# Patient Record
Sex: MALE | Race: WHITE | Employment: FULL TIME | URBAN - METROPOLITAN AREA
[De-identification: names, ages, dates, MRNs, and addresses within clinical notes are randomized per-mention and may not be internally consistent; named-entity substitution may affect disease eponyms.]

---

## 2023-03-20 PROBLEM — F11.11 HX OF OPIOID ABUSE (HCC): Status: ACTIVE | Noted: 2023-03-20

## 2023-03-20 PROBLEM — Z51.81 THERAPEUTIC DRUG MONITORING: Status: ACTIVE | Noted: 2023-03-20

## 2023-03-20 PROBLEM — F19.91 HISTORY OF INTRAVENOUS DRUG USE IN REMISSION: Status: ACTIVE | Noted: 2023-03-20

## 2023-03-20 PROBLEM — Z87.898 HISTORY OF INTRAVENOUS DRUG USE IN REMISSION: Status: ACTIVE | Noted: 2023-03-20

## 2023-05-19 RX ORDER — CLONAZEPAM 2 MG/1
3 TABLET ORAL DAILY PRN
COMMUNITY
Start: 2023-04-21 | End: 2023-05-23 | Stop reason: SDUPTHER

## 2023-05-19 RX ORDER — ZIPRASIDONE HYDROCHLORIDE 20 MG/1
20 CAPSULE ORAL
COMMUNITY
Start: 2023-04-21 | End: 2023-05-23 | Stop reason: SDUPTHER

## 2023-06-06 ENCOUNTER — NURSE ONLY (OUTPATIENT)
Age: 33
End: 2023-06-06

## 2023-06-06 DIAGNOSIS — F11.11 HX OF OPIOID ABUSE (HCC): ICD-10-CM

## 2023-06-06 DIAGNOSIS — F41.9 ANXIETY: ICD-10-CM

## 2023-06-06 DIAGNOSIS — Z51.81 THERAPEUTIC DRUG MONITORING: ICD-10-CM

## 2023-07-17 ENCOUNTER — TELEMEDICINE (OUTPATIENT)
Age: 33
End: 2023-07-17
Payer: MEDICARE

## 2023-07-17 ENCOUNTER — TELEPHONE (OUTPATIENT)
Age: 33
End: 2023-07-17

## 2023-07-17 DIAGNOSIS — K52.9 ACUTE GASTROENTERITIS: ICD-10-CM
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
## 2023-07-17 DIAGNOSIS — F41.9 ANXIETY: ICD-10-CM

## 2023-07-17 PROCEDURE — 99442 PR PHYS/QHP TELEPHONE EVALUATION 11-20 MIN: CPT | Performed by: FAMILY MEDICINE

## 2023-07-17 RX ORDER — CLONAZEPAM 2 MG/1
2 TABLET ORAL 2 TIMES DAILY PRN
Qty: 60 TABLET | Refills: 1 | Status: SHIPPED | OUTPATIENT
Start: 2023-07-20 | End: 2023-09-18

## 2023-07-17 RX ORDER — METRONIDAZOLE 500 MG/1
500 TABLET ORAL 2 TIMES DAILY
Qty: 14 TABLET | Refills: 0 | Status: SHIPPED | OUTPATIENT
Start: 2023-07-17 | End: 2023-07-24

## 2023-07-17 ASSESSMENT — PATIENT HEALTH QUESTIONNAIRE - PHQ9
2. FEELING DOWN, DEPRESSED OR HOPELESS: 0
SUM OF ALL RESPONSES TO PHQ QUESTIONS 1-9: 0
1. LITTLE INTEREST OR PLEASURE IN DOING THINGS: 0
SUM OF ALL RESPONSES TO PHQ QUESTIONS 1-9: 0
SUM OF ALL RESPONSES TO PHQ9 QUESTIONS 1 & 2: 0

## 2023-07-17 NOTE — PROGRESS NOTES
Sabrina Pierce is a 34 y.o. male presenting for/with:    Chief Complaint   Patient presents with    Other     Pt reports flee bites and that his Cat had flees really bad a 1 week ago and he has experienced nausea,stomach pain, vomiting fatigue and a headache. Pt reports taking Pepto bismol last night which hasn't helped his stomach pain. There were no vitals filed for this visit. Pain Scale: /10  Pain Location:     1. \"Have you been to the ER, urgent care clinic since your last visit? Hospitalized since your last visit? \" No    2. \"Have you seen or consulted any other health care providers outside of the [address] [place] [address] since your last visit? \" No     3. For patients aged 43-73: Has the patient had a colonoscopy / FIT/ Cologuard? NA - based on age     If the patient is female:    4. For patients aged 43-66: Has the patient had a mammogram within the past 2 years? NA - based on age    11. For patients aged 21-65: Has the patient had a pap smear? NA - based on age      PHQ-9  7/17/2023   Little interest or pleasure in doing things 0   Little interest or pleasure in doing things -   Feeling down, depressed, or hopeless 0   Trouble falling or staying asleep, or sleeping too much -   Feeling tired or having little energy -   Poor appetite or overeating -   Feeling bad about yourself - or that you are a failure or have let yourself or your family down -   Trouble concentrating on things, such as reading the newspaper or watching television -   Moving or speaking so slowly that other people could have noticed.  Or the opposite - being so fidgety or restless that you have been moving around a lot more than usual -   PHQ-2 Score 0   Total Score PHQ 2 -   PHQ-9 Total Score 0       [facility] AMB LEARNING ASSESSMENT 5/23/2023 3/20/2023   PRIMARY LEARNER Patient Patient   PRIMARY LANGUAGE ENGLISH ENGLISH   LEARNER PREFERENCE PRIMARY DEMONSTRATION DEMONSTRATION   ANSWERED BY patient patient   RELATIONSHIP

## 2023-07-17 NOTE — TELEPHONE ENCOUNTER
Call back # [phone], pt called wanting to be seen today. He states that his cat has flees and he's been bitten so much and he's starting to feel sick to his stomach and pt also has a headache.

## 2023-07-17 NOTE — PROGRESS NOTES
Kaitlynn Vargas is a 34 y.o. male who was seen by synchronous (real-time) audio technology on 7/17/2023. Pt was seen at home in [place]. Provider was at the office. No other participants in this encounter. Subjective: Other (Pt reports flee bites and that his Cat had flees really bad a 1 week ago and he has experienced nausea,stomach pain, vomiting fatigue and a headache.//Pt reports taking Pepto bismol last night which hasn't helped his stomach pain. /)    HPI:  Symptoms include mult flea bites on legs about 2 wk ago, were on his cat. Got cat treated, cleaned up the area, no bities in about 2 weeks. Bites gradually resolved, but about a week ago started having fatigue, HA, loose non-bloody stool about qod. No f/c. No rash or swollen LN's. Peggy po fluids ok. Some rhinorrhea lately, without sinus pain. No epistaxis. Evaluation to date: none. Treatment to date: fluids, pepto bismol, helps some. Anxiety  Currently symptoms are ok on current treatment of clonazepam, 2mg BID and boosted dose geodon to 40mg/d. No sedation about once/week nowadays. Doing well on sertraline 50mg/d + geodon 20mg/d feels more in control with that. Prev on vraylar, remeron, peggy well, but didn't work that well. PHQ-9 Total Score: 0 (7/17/2023 10:22 AM)    PMH, SH, Medications/Allergies: reviewed, on chart. Hx IVDU a couple times in 2020 timeframe. Has a new roommate and is off work lately. Having to get dentures in [employer], has edentulation planned late [address]. Reviewed PMH, PSH, SH, Medications, allergies (see chart). Current Outpatient Medications   Medication Sig    ziprasidone (GEODON) 40 MG capsule Take 1 capsule by mouth daily Indications: nerves    clonazePAM (KLONOPIN) 2 MG tablet Take 1 tablet by mouth 2 times daily as needed for Anxiety for up to 60 days. Max Daily Amount: 4 mg    sertraline (ZOLOFT) 50 MG tablet Take 1 tablet by mouth daily     No current facility-administered medications for this visit.

## 2023-07-24 ENCOUNTER — TELEMEDICINE (OUTPATIENT)
Age: 33
End: 2023-07-24
Payer: MEDICARE

## 2023-07-24 DIAGNOSIS — F41.9 ANXIETY: Primary | ICD-10-CM

## 2023-07-24 DIAGNOSIS — E83.51 HYPOCALCEMIA: ICD-10-CM

## 2023-07-24 PROCEDURE — 99442 PR PHYS/QHP TELEPHONE EVALUATION 11-20 MIN: CPT | Performed by: FAMILY MEDICINE

## 2023-07-24 RX ORDER — CHOLECALCIFEROL (VITAMIN D3) 1250 MCG
50000 CAPSULE ORAL WEEKLY
Qty: 12 CAPSULE | Refills: 3 | Status: SHIPPED | OUTPATIENT
Start: 2023-07-24

## 2023-07-24 ASSESSMENT — PATIENT HEALTH QUESTIONNAIRE - PHQ9
SUM OF ALL RESPONSES TO PHQ QUESTIONS 1-9: 0
1. LITTLE INTEREST OR PLEASURE IN DOING THINGS: 0
SUM OF ALL RESPONSES TO PHQ9 QUESTIONS 1 & 2: 0
2. FEELING DOWN, DEPRESSED OR HOPELESS: 0

## 2023-07-24 NOTE — PROGRESS NOTES
Arnold Saab is a 34 y.o. male presenting for/with:    Chief Complaint   Patient presents with    Anxiety    Medication Check       There were no vitals filed for this visit. Pain Scale: /10  Pain Location:     1. \"Have you been to the ER, urgent care clinic since your last visit? Hospitalized since your last visit? \" No    2. \"Have you seen or consulted any other health care providers outside of the [address] [place] [address] since your last visit? \" No     3. For patients aged 43-73: Has the patient had a colonoscopy / FIT/ Cologuard? NA - based on age     If the patient is female:    4. For patients aged 43-66: Has the patient had a mammogram within the past 2 years? NA - based on age    11. For patients aged 21-65: Has the patient had a pap smear? NA - based on age      PHQ-9  7/24/2023   Little interest or pleasure in doing things 0   Little interest or pleasure in doing things -   Feeling down, depressed, or hopeless 0   Trouble falling or staying asleep, or sleeping too much -   Feeling tired or having little energy -   Poor appetite or overeating -   Feeling bad about yourself - or that you are a failure or have let yourself or your family down -   Trouble concentrating on things, such as reading the newspaper or watching television -   Moving or speaking so slowly that other people could have noticed.  Or the opposite - being so fidgety or restless that you have been moving around a lot more than usual -   PHQ-2 Score 0   Total Score PHQ 2 -   PHQ-9 Total Score 0       [facility] AMB LEARNING ASSESSMENT 7/24/2023 5/23/2023 3/20/2023   PRIMARY LEARNER Patient Patient Patient   PRIMARY LANGUAGE ENGLISH ENGLISH ENGLISH   LEARNER PREFERENCE PRIMARY DEMONSTRATION DEMONSTRATION DEMONSTRATION   ANSWERED BY patient patient patient   RELATIONSHIP SELF SELF SELF        Amb Fall Risk Assessment and TUG Test 5/23/2023   Do you feel unsteady or are you worried about falling?  no   2 or more falls in past year? no   Fall

## 2023-07-24 NOTE — PROGRESS NOTES
Clarice Torres is a 34 y.o. male who was seen by synchronous (real-time) audio technology on 7/24/2023. Pt was seen at home in [facility]. Provider was at the office. No other participants in this encounter. Subjective: Anxiety and Medication Check    HPI:  Anxiety  Currently symptoms are ok on current treatment of clonazepam, 2mg BID, geodon 40mg/d, sertraline 50mg/d . Peggy well, no sedation. Feels more in control with that. Prev on vraylar, remeron, peggy well, but didn't work that well. PHQ-9 Total Score: 0 (7/24/2023 10:20 AM)    PMH, SH, Medications/Allergies: reviewed, on chart. Hx IVDU a couple times in 2020 timeframe. Has a new roommate and is off work lately. Having to get dentures in [place], has edentulation planned late [address]. Reviewed PMH, PSH, SH, Medications, allergies (see chart). Current Outpatient Medications   Medication Sig    clonazePAM (KLONOPIN) 2 MG tablet Take 1 tablet by mouth 2 times daily as needed for Anxiety for up to 60 days. Max Daily Amount: 4 mg    metroNIDAZOLE (FLAGYL) 500 MG tablet Take 1 tablet by mouth 2 times daily for 7 days Indications: stomach infection    ziprasidone (GEODON) 40 MG capsule Take 1 capsule by mouth daily Indications: nerves    sertraline (ZOLOFT) 50 MG tablet Take 1 tablet by mouth daily     No current facility-administered medications for this visit. ROS:   General: No fever, chills, or abnormal weight loss  Respiratory: No cough, dyspnea  CV: No chest pain, palpitations    Objective:   Wt Readings from Last 3 Encounters:   04/21/23 195 lb 12.8 oz (88.8 kg)   03/20/23 198 lb 6.4 oz (90 kg)   02/24/22 200 lb (90.7 kg)     BP Readings from Last 3 Encounters:   04/21/23 138/78   03/20/23 112/72   02/24/22 112/74      Physical Examination: General appearance - alert, well appearing, and in no distress   Mental status - alert, oriented to person, place, and time   Eyes - pupils equal and reactive, extraocular eye movements intact   ENT -

## 2023-09-11 DIAGNOSIS — F41.9 ANXIETY: ICD-10-CM

## 2023-09-11 RX ORDER — CLONAZEPAM 2 MG/1
2 TABLET ORAL 2 TIMES DAILY PRN
Qty: 14 TABLET | Refills: 0 | Status: SHIPPED | OUTPATIENT
Start: 2023-09-11 | End: 2023-09-18

## 2023-09-11 NOTE — TELEPHONE ENCOUNTER
----- Message from Mook Santana sent at 9/11/2023  1:03 PM EDT -----  Subject: Refill Request    QUESTIONS  Name of Medication? clonazePAM (KLONOPIN) 2 MG tablet  Patient-reported dosage and instructions? Take 1 tablet 2 times daily  How many days do you have left? 2  [id number]  Pharmacy phone number (if available)? [phone]  ---------------------------------------------------------------------------  --------------  CALL BACK INFO  What is the best way for the office to contact you? Do not leave any   message, patient will call back for answer  Preferred Call Back Phone Number? [phone]  ---------------------------------------------------------------------------  --------------  SCRIPT ANSWERS  Relationship to Patient?  Self

## 2023-09-11 NOTE — TELEPHONE ENCOUNTER
2 mo fill was given in JUL 2023, and both have been filled, on on 7/17 and one on 8/14, 3 days early. He is not due for a renewal until 9/15 by dates. [place]  reviewed. PT got a fill of #20 norco 5mg from Dr Harsh Yu DDS on 8/4, which is a high risk med to combine with clonazepam 2mg BID. Chart reviewed, and no notes informing us of opioid Rx's are on chart. 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 Contacted pt. Reviewed med use agreement. Noted that pt needs to be better about adhering to his regular regimen and better about informing me of emergency pain relief meds. Will give single short fill to last until visit. Made clear to pt that any further irregularities would result in my being unable to continue to manage his anxiety with this kind of med, and would require a wean. Pt gave understanding.

## 2023-09-20 ENCOUNTER — OFFICE VISIT (OUTPATIENT)
Age: 33
End: 2023-09-20
Payer: MEDICARE

## 2023-09-20 VITALS
HEART RATE: 62 BPM | HEIGHT: 69 IN | SYSTOLIC BLOOD PRESSURE: 150 MMHG | RESPIRATION RATE: 20 BRPM | TEMPERATURE: 98.2 F | WEIGHT: 187 LBS | DIASTOLIC BLOOD PRESSURE: 101 MMHG | BODY MASS INDEX: 27.7 KG/M2 | OXYGEN SATURATION: 99 %

## 2023-09-20 DIAGNOSIS — Z51.81 THERAPEUTIC DRUG MONITORING: ICD-10-CM

## 2023-09-20 DIAGNOSIS — E83.51 HYPOCALCEMIA: ICD-10-CM

## 2023-09-20 DIAGNOSIS — F41.9 ANXIETY: Primary | ICD-10-CM

## 2023-09-20 PROCEDURE — 99213 OFFICE O/P EST LOW 20 MIN: CPT | Performed by: FAMILY MEDICINE

## 2023-09-20 RX ORDER — MIRTAZAPINE 7.5 MG/1
7.5 TABLET, FILM COATED ORAL NIGHTLY
Qty: 30 TABLET | Refills: 5 | Status: SHIPPED | OUTPATIENT
Start: 2023-09-20

## 2023-09-20 RX ORDER — CLONAZEPAM 2 MG/1
2 TABLET ORAL 2 TIMES DAILY PRN
Qty: 60 TABLET | Refills: 2 | Status: SHIPPED | OUTPATIENT
Start: 2023-09-20 | End: 2023-12-19

## 2023-09-20 ASSESSMENT — PATIENT HEALTH QUESTIONNAIRE - PHQ9
2. FEELING DOWN, DEPRESSED OR HOPELESS: 0
SUM OF ALL RESPONSES TO PHQ9 QUESTIONS 1 & 2: 0
SUM OF ALL RESPONSES TO PHQ QUESTIONS 1-9: 0
1. LITTLE INTEREST OR PLEASURE IN DOING THINGS: 0
SUM OF ALL RESPONSES TO PHQ QUESTIONS 1-9: 0

## 2023-09-20 NOTE — PROGRESS NOTES
(includes cane/walker) No Help Needed No Help Needed No Help Needed No Help Needed   Using the telphone No Help Needed No Help Needed No Help Needed No Help Needed   Taking your medications No Help Needed No Help Needed No Help Needed No Help Needed   Preparing meals No Help Needed No Help Needed No Help Needed No Help Needed   Managing money (expenses/bills) No Help Needed No Help Needed No Help Needed No Help Needed   Moderately strenuous housework (laundry) No Help Needed No Help Needed No Help Needed No Help Needed   Shopping for personal items (toiletries/medicines) No Help Needed No Help Needed No Help Needed No Help Needed   Shopping for groceries No Help Needed No Help Needed No Help Needed No Help Needed   Driving No Help Needed No Help Needed No Help Needed No Help Needed   Climbing a flight of stairs No Help Needed No Help Needed No Help Needed No Help Needed   Getting to places beyond walking distances No Help Needed No Help Needed No Help Needed No Help Needed           9/20/2023    11:00 AM   AMB Abuse Screening   Do you ever feel afraid of your partner? N   Are you in a relationship with someone who physically or mentally threatens you? N   Is it safe for you to go home?  [facility]     The patient has appointed the following active healthcare agents:    Primary Decision Maker: Denise Escobar - [facility] - [phone]

## 2023-09-21 LAB
BUN SERPL-MCNC: 10 MG/DL (ref 6–20)
BUN/CREAT SERPL: 15 (ref 9–20)
CALCIUM SERPL-MCNC: 9.5 MG/DL (ref 8.7–10.2)
CHLORIDE SERPL-SCNC: 103 MMOL/L (ref 96–106)
CO2 SERPL-SCNC: 25 MMOL/L (ref 20–29)
CREAT SERPL-MCNC: 0.66 MG/DL (ref 0.76–1.27)
EGFRCR SERPLBLD CKD-EPI 2021: 126 ML/MIN/1.73
GLUCOSE SERPL-MCNC: 91 MG/DL (ref 70–99)
POTASSIUM SERPL-SCNC: 4.8 MMOL/L (ref 3.5–5.2)
SODIUM SERPL-SCNC: 142 MMOL/L (ref 134–144)

## 2023-09-25 ENCOUNTER — PATIENT MESSAGE (OUTPATIENT)
Age: 33
End: 2023-09-25

## 2023-09-25 DIAGNOSIS — F41.9 ANXIETY: Primary | ICD-10-CM

## 2023-09-25 RX ORDER — DOXEPIN HYDROCHLORIDE 25 MG/1
25 CAPSULE ORAL NIGHTLY
Qty: 30 CAPSULE | Refills: 11 | Status: SHIPPED | OUTPATIENT
Start: 2023-09-25 | End: 2023-09-29 | Stop reason: ALTCHOICE

## 2023-09-25 NOTE — TELEPHONE ENCOUNTER
[facility] 9/25/2023 1:03 PM EDT      ----- Message -----  From: Esther Menjivar  Sent: 9/25/2023 12:42 PM EDT  To: *  Subject: Remron/sleep     Dr. Yusuf Michaels,     The doxepin sounds like a good idea. The family its in of antidepressants cyclotic or something, I don't think I've tried those before. But yes lower brain activity while I'm sleeping sounds similar to a anti anxiety to me. Sounds like 2 for one to me. Sleep better and depression. 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      Great suggestion and thanks for your time,  Irena Lane

## 2023-09-27 ENCOUNTER — HOSPITAL ENCOUNTER (EMERGENCY)
Facility: HOSPITAL | Age: 33
Discharge: HOME | End: 2023-09-27
Attending: EMERGENCY MEDICINE
Payer: MEDICARE

## 2023-09-27 VITALS
RESPIRATION RATE: 16 BRPM | HEART RATE: 104 BPM | SYSTOLIC BLOOD PRESSURE: 135 MMHG | TEMPERATURE: 98.8 F | DIASTOLIC BLOOD PRESSURE: 86 MMHG | OXYGEN SATURATION: 100 %

## 2023-09-27 DIAGNOSIS — G47.00 INSOMNIA, UNSPECIFIED TYPE: Primary | ICD-10-CM

## 2023-09-27 DIAGNOSIS — G47.00 DISORDER OF INITIATING AND MAINTAINING SLEEP: ICD-10-CM

## 2023-09-27 PROCEDURE — 99284 EMERGENCY DEPT VISIT MOD MDM: CPT

## 2023-09-27 PROCEDURE — 6360000002 HC RX W HCPCS: Performed by: EMERGENCY MEDICINE

## 2023-09-27 PROCEDURE — 96372 THER/PROPH/DIAG INJ SC/IM: CPT

## 2023-09-27 RX ORDER — ZOLPIDEM TARTRATE 5 MG/1
5 TABLET ORAL NIGHTLY PRN
Qty: 6 TABLET | Refills: 0 | Status: SHIPPED | OUTPATIENT
Start: 2023-09-27 | End: 2023-09-29 | Stop reason: ALTCHOICE

## 2023-09-27 RX ORDER — HYDROXYZINE HYDROCHLORIDE 25 MG/ML
50 INJECTION, SOLUTION INTRAMUSCULAR
Status: COMPLETED | OUTPATIENT
Start: 2023-09-27 | End: 2023-09-27

## 2023-09-27 RX ADMIN — HYDROXYZINE HYDROCHLORIDE 50 MG: 25 INJECTION, SOLUTION INTRAMUSCULAR at 04:43

## 2023-09-27 ASSESSMENT — ENCOUNTER SYMPTOMS
ABDOMINAL PAIN: 0
SHORTNESS OF BREATH: 0

## 2023-09-27 ASSESSMENT — LIFESTYLE VARIABLES: HOW MANY STANDARD DRINKS CONTAINING ALCOHOL DO YOU HAVE ON A TYPICAL DAY: PATIENT DOES NOT DRINK

## 2023-09-27 ASSESSMENT — PAIN - FUNCTIONAL ASSESSMENT: PAIN_FUNCTIONAL_ASSESSMENT: NONE - DENIES PAIN

## 2023-09-27 NOTE — ED TRIAGE NOTES
Patient states that he has been having trouble staying asleep.  According to the patient he has only had 10 hours of sleep in the last 5 days

## 2023-09-27 NOTE — ED PROVIDER NOTES
[facility] [address] [facility]       Pt Name: Kristin Ventura  MRN: [id number]  [facility] 1990  Date of evaluation: 9/27/2023  Provider: Diane Reid MD   PCP: Rashida Patten MD  Note Started: 4:38 AM 9/27/23      FINAL IMPRESSION     1. Insomnia, unspecified type    2. Disorder of initiating and maintaining sleep          DISPOSITION/PLAN     Disposition:  DISPOSITION Discharge - Pending Orders Complete 09/27/2023 04:29:45 AM      Discharge Note: The patient is stable for discharge home. The signs, symptoms, diagnosis, and discharge instructions have been discussed, understanding conveyed, and agreed upon. The patient is to follow up as recommended or return to ER should their symptoms worsen. PATIENT REFERRED TO:  Autumn Ortega MD  [address] [place] [address]  [phone]    Schedule an appointment as soon as possible for a visit in 1 day  For Follow Up          DISCHARGE MEDICATIONS:     Medication List        START taking these medications      zolpidem 5 MG tablet  Commonly known as: AMBIEN  Take 1 tablet by mouth nightly as needed for Sleep (sleep maintenance) for up to 6 days. Max Daily Amount: 5 mg            ASK your doctor about these medications      clonazePAM 2 MG tablet  Commonly known as: KLONOPIN  Take 1 tablet by mouth 2 times daily as needed for Anxiety for up to 90 days.  Max Daily Amount: 4 mg     doxepin 25 MG capsule  Commonly known as: SINEQUAN  Take 1 capsule by mouth nightly Indications: nerves and sleep     Vitamin D3 1.25 MG (20607 UT) Caps  Take 1 capsule by mouth once a week Indications: LOW CALCIUM     ziprasidone 40 MG capsule  Commonly known as: GEODON  Take 1 capsule by mouth daily Indications: nerves               Where to Get Your Medications        [facility], [address] [place] [address], [address] [place] [address]

## 2023-09-29 ENCOUNTER — TELEMEDICINE (OUTPATIENT)
Age: 33
End: 2023-09-29
Payer: MEDICARE

## 2023-09-29 DIAGNOSIS — F11.11 HX OF OPIOID ABUSE (HCC): ICD-10-CM

## 2023-09-29 DIAGNOSIS — F19.91 OTHER PSYCHOACTIVE SUBSTANCE USE, UNSPECIFIED, IN REMISSION: ICD-10-CM

## 2023-09-29 DIAGNOSIS — F30.8 HYPOMANIA (HCC): ICD-10-CM

## 2023-09-29 DIAGNOSIS — F41.9 ANXIETY: Primary | ICD-10-CM

## 2023-09-29 PROCEDURE — 99442 PR PHYS/QHP TELEPHONE EVALUATION 11-20 MIN: CPT | Performed by: FAMILY MEDICINE

## 2023-09-29 RX ORDER — QUETIAPINE FUMARATE 300 MG/1
300 TABLET, FILM COATED ORAL NIGHTLY
Qty: 5 TABLET | Refills: 1 | Status: SHIPPED | OUTPATIENT
Start: 2023-09-29

## 2023-09-29 ASSESSMENT — PATIENT HEALTH QUESTIONNAIRE - PHQ9
SUM OF ALL RESPONSES TO PHQ QUESTIONS 1-9: 0
SUM OF ALL RESPONSES TO PHQ QUESTIONS 1-9: 0
1. LITTLE INTEREST OR PLEASURE IN DOING THINGS: 0
SUM OF ALL RESPONSES TO PHQ QUESTIONS 1-9: 0
SUM OF ALL RESPONSES TO PHQ9 QUESTIONS 1 & 2: 0
SUM OF ALL RESPONSES TO PHQ QUESTIONS 1-9: 0
2. FEELING DOWN, DEPRESSED OR HOPELESS: 0

## 2023-09-29 NOTE — PROGRESS NOTES
Bettye Singh is a 28 y.o. male who was seen by synchronous (real-time) audio technology on 9/29/2023. Pt was seen at home in [place]. Provider was at the office in [place]. No other participants in this encounter. Subjective: Insomnia  Pt reports still not sleeping well. We sent pt to ED for AMS with severe insomnia earlier this month, and pt had eval. Dx with severe insomnia and tx with adding ambien 5mg to his regimen. Pt proceeded to d/c his geodon, doxepin, and take all of the ambien in an afteroon yesterday while trying to get to sleep, thinking instructions were to \"take another ambient every time he wakes up, until sleeps good. \"    PMH, SH, Medications/Allergies: reviewed, on chart. Current Outpatient Medications   Medication Sig    QUEtiapine (SEROQUEL) 300 MG tablet Take 1 tablet by mouth at bedtime Indications: sleep and nerves    clonazePAM (KLONOPIN) 2 MG tablet Take 1 tablet by mouth 2 times daily as needed for Anxiety for up to 90 days. Max Daily Amount: 4 mg    Cholecalciferol (VITAMIN D3) 1.25 MG (18681 UT) CAPS Take 1 capsule by mouth once a week Indications: LOW CALCIUM (Patient not taking: Reported on 9/20/2023)     No current facility-administered medications for this visit. Allergies   Allergen Reactions    Sulfa Antibiotics Other (See Comments)    Sulfamethoxazole-Trimethoprim Rash       ROS:  Constitutional: No fever, chills or abnormal weight loss  Respiratory: No cough, SOB   CV: No chest pain or Palpitations    VS review:   Wt Readings from Last 3 Encounters:   09/20/23 187 lb (84.8 kg)   04/21/23 195 lb 12.8 oz (88.8 kg)   03/20/23 198 lb 6.4 oz (90 kg)     BP Readings from Last 3 Encounters:   09/27/23 135/86   09/20/23 (!) 150/101   04/21/23 138/78       Objective:     General: alert, cooperative, and no distress   Mental  status: mental status: alert, oriented to person, place, and time, normal mood, behavior, speech, dress, motor activity, and thought processes

## 2023-09-30 LAB
ALPRAZ UR QL: NEGATIVE
AMPHETAMINES UR QL SCN: NEGATIVE NG/ML
BARBITURATES UR QL SCN: NEGATIVE NG/ML
BENZODIAZ UR QL: POSITIVE NG/ML
BZE UR QL SCN: NEGATIVE NG/ML
CANNABINOIDS UR QL CFM: POSITIVE
CANNABINOIDS UR QL SCN: ABNORMAL NG/ML
CLONAZEPAM UR CFM-MCNC: 481 NG/ML
CLONAZEPAM UR QL: POSITIVE
CREAT UR-MCNC: 90.8 MG/DL (ref 20–300)
FLURAZEPAM UR QL: NEGATIVE
LABORATORY COMMENT REPORT: ABNORMAL
LORAZEPAM UR QL: NEGATIVE
METHADONE UR QL SCN: NEGATIVE NG/ML
MIDAZOLAM UR QL CFM: NEGATIVE
NORDIAZEPAM UR QL: NEGATIVE
OPIATES UR QL SCN: NEGATIVE NG/ML
OXAZEPAM UR QL: NEGATIVE
OXYCODONE+OXYMORPHONE UR QL SCN: NEGATIVE NG/ML
PCP UR QL: NEGATIVE NG/ML
PH UR: 6.5 [PH] (ref 4.5–8.9)
PROPOXYPH UR QL SCN: NEGATIVE NG/ML
SP GR UR: 1.02
TEMAZEPAM UR QL CFM: NEGATIVE
THC UR CFM-MCNC: 550 NG/ML
TRIAZOLAM UR QL: NEGATIVE

## 2024-01-19 ENCOUNTER — TELEMEDICINE (OUTPATIENT)
Age: 34
End: 2024-01-19
Payer: MEDICARE

## 2024-01-19 DIAGNOSIS — F41.9 ANXIETY: ICD-10-CM

## 2024-01-19 DIAGNOSIS — E78.2 MIXED HYPERLIPIDEMIA: ICD-10-CM

## 2024-01-19 DIAGNOSIS — Z87.81 HISTORY OF RIB FRACTURE: Primary | ICD-10-CM

## 2024-01-19 DIAGNOSIS — Z87.81 HISTORY OF WRIST FRACTURE: ICD-10-CM

## 2024-01-19 DIAGNOSIS — R73.9 HYPERGLYCEMIA: ICD-10-CM

## 2024-01-19 PROCEDURE — 99443 PR PHYS/QHP TELEPHONE EVALUATION 21-30 MIN: CPT | Performed by: FAMILY MEDICINE

## 2024-01-19 RX ORDER — METHOCARBAMOL 500 MG/1
1000 TABLET, FILM COATED ORAL 4 TIMES DAILY
COMMUNITY
Start: 2024-01-09

## 2024-01-19 RX ORDER — CLONAZEPAM 2 MG/1
2 TABLET ORAL 2 TIMES DAILY PRN
Qty: 60 TABLET | Refills: 2 | Status: SHIPPED | OUTPATIENT
Start: 2024-01-22 | End: 2024-04-21

## 2024-01-19 ASSESSMENT — PATIENT HEALTH QUESTIONNAIRE - PHQ9
SUM OF ALL RESPONSES TO PHQ QUESTIONS 1-9: 0
SUM OF ALL RESPONSES TO PHQ QUESTIONS 1-9: 0
SUM OF ALL RESPONSES TO PHQ9 QUESTIONS 1 & 2: 0
SUM OF ALL RESPONSES TO PHQ QUESTIONS 1-9: 0
2. FEELING DOWN, DEPRESSED OR HOPELESS: 0
1. LITTLE INTEREST OR PLEASURE IN DOING THINGS: 0
SUM OF ALL RESPONSES TO PHQ QUESTIONS 1-9: 0

## 2024-01-19 NOTE — PROGRESS NOTES
Rush DIXON Marco  is a 33 y.o. male presenting for/with:    Chief Complaint   Patient presents with    Medication Refill       There were no vitals filed for this visit.    Pain Scale: /10  Pain Location:     \"Have you been to the ER, urgent care clinic since your last visit?  Hospitalized since your last visit?\"    [facility]- Fall 12/2023    “Have you seen or consulted any other health care providers outside of [facility] since your last visit?”    Ortho- 1/18/2024 1/19/2024    11:01 AM   PHQ-9    Little interest or pleasure in doing things 0   Feeling down, depressed, or hopeless 0   PHQ-2 Score 0   PHQ-9 Total Score 0           1/19/2024     4:10 PM 7/24/2023     4:10 PM 5/23/2023    10:30 AM 3/20/2023    12:00 AM   [facility] AMB LEARNING ASSESSMENT   Primary Learner Patient Patient Patient Patient   Primary Language ENGLISH ENGLISH ENGLISH ENGLISH   Learning Preference DEMONSTRATION DEMONSTRATION DEMONSTRATION DEMONSTRATION   Answered By demonstration patient patient patient   Relationship to Learner SELF SELF SELF SELF            1/19/2024    11:02 AM   Amb Fall Risk Assessment and TUG Test   Do you feel unsteady or are you worried about falling?  no   2 or more falls in past year? no   Fall with injury in past year? yes           1/19/2024    11:00 AM 9/29/2023     3:00 PM 9/20/2023    11:00 AM 7/24/2023    10:00 AM 7/17/2023    10:00 AM 5/23/2023    10:00 AM   ADL ASSESSMENT   Feeding yourself No Help Needed No Help Needed No Help Needed No Help Needed No Help Needed No Help Needed   Getting from bed to chair No Help Needed No Help Needed No Help Needed No Help Needed No Help Needed No Help Needed   Getting dressed No Help Needed No Help Needed No Help Needed No Help Needed No Help Needed No Help Needed   Bathing or showering No Help Needed No Help Needed No Help Needed No Help Needed No Help Needed No Help Needed   Walk across the room (includes cane/walker) No Help Needed No

## 2024-01-19 NOTE — PROGRESS NOTES
Rush Lara Jr is a 33 y.o. male who was seen by synchronous (real-time) audio technology on 1/19/2024.  Pt was seen at home in [place]. Provider was at the office in [place]. No other participants in this encounter.    Subjective:   Medication Refill  Pt had an eventful holiday.      still not sleeping well. We sent pt to ED for AMS with severe insomnia earlier this month, and pt had eval. Dx with severe insomnia and tx with adding ambien 5mg to his regimen. Pt proceeded to d/c his geodon, doxepin, and take all of the ambien in an afteroon yesterday while trying to get to sleep, thinking instructions were to \"take another ambient every time he wakes up, until sleeps good.\"    PMH, SH, Medications/Allergies: reviewed, on chart.  Current Outpatient Medications   Medication Sig    diclofenac (VOLTAREN) 50 MG EC tablet Take 1 tablet by mouth 2 times daily    methocarbamol (ROBAXIN) 500 MG tablet Take 2 tablets by mouth 4 times daily    clonazePAM (KLONOPIN) 2 MG tablet Take 1 tablet by mouth 2 times daily as needed for Anxiety for up to 90 days. Max Daily Amount: 4 mg    QUEtiapine (SEROQUEL) 300 MG tablet Take 1 tablet by mouth at bedtime Indications: sleep and nerves (Patient not taking: Reported on 1/19/2024)    Cholecalciferol (VITAMIN D3) 1.25 MG (99796 UT) CAPS Take 1 capsule by mouth once a week Indications: LOW CALCIUM (Patient not taking: Reported on 9/20/2023)     No current facility-administered medications for this visit.      Allergies   Allergen Reactions    Sulfa Antibiotics Other (See Comments)    Sulfamethoxazole-Trimethoprim Rash and Hives     ROS:  Constitutional: No fever, chills or abnormal weight loss  Respiratory: No cough, SOB   CV: No chest pain or Palpitations    VS review:  Wt Readings from Last 3 Encounters:   09/20/23 84.8 kg (187 lb)   04/21/23 88.8 kg (195 lb 12.8 oz)   03/20/23 90 kg (198 lb 6.4 oz)     BP Readings from Last 3 Encounters:   09/27/23 135/86   09/20/23 (!) 150/101

## 2024-02-19 ENCOUNTER — TELEMEDICINE (OUTPATIENT)
Age: 34
End: 2024-02-19
Payer: MEDICARE

## 2024-02-19 DIAGNOSIS — L23.0 ALLERGIC CONTACT DERMATITIS DUE TO METALS: Primary | ICD-10-CM

## 2024-02-19 DIAGNOSIS — L30.4 INTERTRIGO: ICD-10-CM

## 2024-02-19 PROCEDURE — 99442 PR PHYS/QHP TELEPHONE EVALUATION 11-20 MIN: CPT | Performed by: FAMILY MEDICINE

## 2024-02-19 RX ORDER — CLOTRIMAZOLE AND BETAMETHASONE DIPROPIONATE 10; .64 MG/G; MG/G
CREAM TOPICAL
Qty: 45 G | Refills: 3 | Status: SHIPPED | OUTPATIENT
Start: 2024-02-19

## 2024-02-19 NOTE — PROGRESS NOTES
Rush DIXON Marco Galaviz is a 33 y.o. male who was seen by synchronous (real-time) audio technology on 2/19/2024.  Pt was seen at home in [place]. Provider was at the office in [place]. No other participants in this encounter.    Subjective:   Skin Problem (Arrived at office today with C/O rash. States previous treated in the same area for ringworm. Rash now present. Picture sent via [employer] in media. Rash goes across midline of ABD. )    Pt reports above sx for past 2mo, getting better to groin with the fungus cream, but the area near the waistline isn't. Does wear a metal belt buckle that rubs the affected area.    PMH, SH, Medications/Allergies: reviewed, on chart.  Current Outpatient Medications   Medication Sig    diclofenac (VOLTAREN) 50 MG EC tablet Take 1 tablet by mouth 2 times daily    methocarbamol (ROBAXIN) 500 MG tablet Take 2 tablets by mouth 4 times daily    clonazePAM (KLONOPIN) 2 MG tablet Take 1 tablet by mouth 2 times daily as needed for Anxiety for up to 90 days. Max Daily Amount: 4 mg    QUEtiapine (SEROQUEL) 300 MG tablet Take 1 tablet by mouth at bedtime Indications: sleep and nerves (Patient not taking: Reported on 1/19/2024)    Cholecalciferol (VITAMIN D3) 1.25 MG (13063 UT) CAPS Take 1 capsule by mouth once a week Indications: LOW CALCIUM (Patient not taking: Reported on 9/20/2023)     No current facility-administered medications for this visit.      Allergies   Allergen Reactions    Sulfa Antibiotics Other (See Comments)    Sulfamethoxazole-Trimethoprim Rash and Hives     ROS:  Constitutional: No fever, chills or abnormal weight loss  Respiratory: No cough, SOB   CV: No chest pain or Palpitations    Objective:     VS review:  Wt Readings from Last 3 Encounters:   09/20/23 84.8 kg (187 lb)   04/21/23 88.8 kg (195 lb 12.8 oz)   03/20/23 90 kg (198 lb 6.4 oz)     BP Readings from Last 3 Encounters:   09/27/23 135/86   09/20/23 (!) 150/101   04/21/23 138/78     General: alert, cooperative, and no

## 2024-02-19 NOTE — PROGRESS NOTES
Rush DIXON Marco Galaviz is a 33 y.o. male presenting for/with:    Chief Complaint   Patient presents with    Skin Problem     Arrived at office today with C/O rash. States previous treated in the same area for ringworm. Rash now present. Picture sent via [email or web address]. in media. Rash goes across midline of ABD.        There were no vitals filed for this visit.    Pain Scale: /10  Pain Location:     \"Have you been to the ER, urgent care clinic since your last visit?  Hospitalized since your last visit?\"    NO    “Have you seen or consulted any other health care providers outside of [facility] since your last visit?”    NO                 1/19/2024    11:01 AM   PHQ-9    Little interest or pleasure in doing things 0   Feeling down, depressed, or hopeless 0   PHQ-2 Score 0   PHQ-9 Total Score 0           1/19/2024     4:10 PM 7/24/2023     4:10 PM 5/23/2023    10:30 AM 3/20/2023    12:00 AM   [facility] AMB LEARNING ASSESSMENT   Primary Learner Patient Patient Patient Patient   Primary Language ENGLISH ENGLISH ENGLISH ENGLISH   Learning Preference DEMONSTRATION DEMONSTRATION DEMONSTRATION DEMONSTRATION   Answered By demonstration patient patient patient   Relationship to Learner SELF SELF SELF SELF            1/19/2024    11:02 AM   Amb Fall Risk Assessment and TUG Test   Do you feel unsteady or are you worried about falling?  no   2 or more falls in past year? no   Fall with injury in past year? yes           1/19/2024    11:00 AM 9/29/2023     3:00 PM 9/20/2023    11:00 AM 7/24/2023    10:00 AM 7/17/2023    10:00 AM 5/23/2023    10:00 AM   ADL ASSESSMENT   Feeding yourself No Help Needed No Help Needed No Help Needed No Help Needed No Help Needed No Help Needed   Getting from bed to chair No Help Needed No Help Needed No Help Needed No Help Needed No Help Needed No Help Needed   Getting dressed No Help Needed No Help Needed No Help Needed No Help Needed No Help Needed No Help Needed   Bathing or showering No Help

## 2024-03-25 DIAGNOSIS — L30.4 INTERTRIGO: ICD-10-CM

## 2024-03-25 DIAGNOSIS — L23.0 ALLERGIC CONTACT DERMATITIS DUE TO METALS: ICD-10-CM

## 2024-03-26 RX ORDER — CLOTRIMAZOLE AND BETAMETHASONE DIPROPIONATE 10; .64 MG/G; MG/G
CREAM TOPICAL
Qty: 45 G | Refills: 3 | Status: SHIPPED | OUTPATIENT
Start: 2024-03-26

## 2024-03-26 NOTE — TELEPHONE ENCOUNTER
RF cream for tinea cruris. PT asking for renewal of gabapentin, which has been ordered by [facility] ortho for past several fills. Will ask about that and go from there.

## 2024-04-05 ENCOUNTER — HOSPITAL ENCOUNTER (EMERGENCY)
Facility: HOSPITAL | Age: 34
Discharge: HOME | End: 2024-04-05
Attending: EMERGENCY MEDICINE
Payer: MEDICARE

## 2024-04-05 ENCOUNTER — APPOINTMENT (OUTPATIENT)
Facility: HOSPITAL | Age: 34
End: 2024-04-05
Payer: MEDICARE

## 2024-04-05 VITALS
DIASTOLIC BLOOD PRESSURE: 74 MMHG | WEIGHT: 190 LBS | BODY MASS INDEX: 28.14 KG/M2 | HEIGHT: 69 IN | HEART RATE: 94 BPM | OXYGEN SATURATION: 99 % | TEMPERATURE: 98.3 F | RESPIRATION RATE: 16 BRPM | SYSTOLIC BLOOD PRESSURE: 128 MMHG

## 2024-04-05 DIAGNOSIS — J40 BRONCHITIS: Primary | ICD-10-CM

## 2024-04-05 LAB
FLUAV RNA SPEC QL NAA+PROBE: NOT DETECTED
FLUBV RNA SPEC QL NAA+PROBE: NOT DETECTED
SARS-COV-2 RNA RESP QL NAA+PROBE: NOT DETECTED

## 2024-04-05 PROCEDURE — 71046 X-RAY EXAM CHEST 2 VIEWS: CPT

## 2024-04-05 PROCEDURE — 87636 SARSCOV2 & INF A&B AMP PRB: CPT

## 2024-04-05 PROCEDURE — 6370000000 HC RX 637 (ALT 250 FOR IP): Performed by: EMERGENCY MEDICINE

## 2024-04-05 PROCEDURE — 99284 EMERGENCY DEPT VISIT MOD MDM: CPT

## 2024-04-05 PROCEDURE — 94640 AIRWAY INHALATION TREATMENT: CPT

## 2024-04-05 RX ORDER — BENZONATATE 100 MG/1
100 CAPSULE ORAL 3 TIMES DAILY PRN
Qty: 30 CAPSULE | Refills: 0 | Status: SHIPPED | OUTPATIENT
Start: 2024-04-05 | End: 2024-04-15

## 2024-04-05 RX ORDER — IPRATROPIUM BROMIDE AND ALBUTEROL SULFATE 2.5; .5 MG/3ML; MG/3ML
1 SOLUTION RESPIRATORY (INHALATION)
Status: COMPLETED | OUTPATIENT
Start: 2024-04-05 | End: 2024-04-05

## 2024-04-05 RX ORDER — AZITHROMYCIN 250 MG/1
TABLET, FILM COATED ORAL
Qty: 1 PACKET | Refills: 0 | Status: SHIPPED | OUTPATIENT
Start: 2024-04-05 | End: 2024-04-09

## 2024-04-05 RX ADMIN — IPRATROPIUM BROMIDE AND ALBUTEROL SULFATE 1 DOSE: .5; 2.5 SOLUTION RESPIRATORY (INHALATION) at 12:58

## 2024-04-05 ASSESSMENT — PAIN - FUNCTIONAL ASSESSMENT: PAIN_FUNCTIONAL_ASSESSMENT: 0-10

## 2024-04-05 ASSESSMENT — PAIN DESCRIPTION - LOCATION: LOCATION: HEAD

## 2024-04-05 ASSESSMENT — LIFESTYLE VARIABLES
HOW OFTEN DO YOU HAVE A DRINK CONTAINING ALCOHOL: NEVER
HOW MANY STANDARD DRINKS CONTAINING ALCOHOL DO YOU HAVE ON A TYPICAL DAY: PATIENT DOES NOT DRINK

## 2024-04-05 ASSESSMENT — PAIN SCALES - GENERAL: PAINLEVEL_OUTOF10: 3
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
## 2024-04-10 DIAGNOSIS — F41.9 ANXIETY: ICD-10-CM

## 2024-04-10 RX ORDER — CLONAZEPAM 2 MG/1
2 TABLET ORAL 2 TIMES DAILY PRN
Qty: 60 TABLET | Refills: 0 | Status: SHIPPED | OUTPATIENT
Start: 2024-04-10 | End: 2024-07-09

## 2024-04-15 ENCOUNTER — OFFICE VISIT (OUTPATIENT)
Age: 34
End: 2024-04-15
Payer: MEDICARE

## 2024-04-15 VITALS — TEMPERATURE: 97.8 F | HEART RATE: 109 BPM | OXYGEN SATURATION: 92 % | RESPIRATION RATE: 18 BRPM

## 2024-04-15 DIAGNOSIS — J20.9 ACUTE BRONCHITIS, UNSPECIFIED ORGANISM: Primary | ICD-10-CM

## 2024-04-15 PROCEDURE — 99213 OFFICE O/P EST LOW 20 MIN: CPT | Performed by: FAMILY MEDICINE

## 2024-04-15 RX ORDER — ALBUTEROL SULFATE 90 UG/1
2 AEROSOL, METERED RESPIRATORY (INHALATION) 4 TIMES DAILY PRN
Qty: 18 G | Refills: 5 | Status: SHIPPED | OUTPATIENT
Start: 2024-04-15

## 2024-04-15 RX ORDER — PREDNISONE 20 MG/1
20 TABLET ORAL DAILY
Qty: 5 TABLET | Refills: 0 | Status: SHIPPED | OUTPATIENT
Start: 2024-04-15 | End: 2024-04-20

## 2024-04-15 RX ORDER — BENZONATATE 100 MG/1
100 CAPSULE ORAL 3 TIMES DAILY PRN
Qty: 30 CAPSULE | Refills: 0 | Status: SHIPPED | OUTPATIENT
Start: 2024-04-15 | End: 2024-04-25

## 2024-04-15 ASSESSMENT — PATIENT HEALTH QUESTIONNAIRE - PHQ9
SUM OF ALL RESPONSES TO PHQ QUESTIONS 1-9: 0
SUM OF ALL RESPONSES TO PHQ9 QUESTIONS 1 & 2: 0
1. LITTLE INTEREST OR PLEASURE IN DOING THINGS: NOT AT ALL
SUM OF ALL RESPONSES TO PHQ QUESTIONS 1-9: 0
2. FEELING DOWN, DEPRESSED OR HOPELESS: NOT AT ALL
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
## 2024-04-17 ENCOUNTER — PATIENT MESSAGE (OUTPATIENT)
Age: 34
End: 2024-04-17

## 2024-04-17 DIAGNOSIS — F41.9 ANXIETY: Primary | ICD-10-CM

## 2024-04-19 RX ORDER — GABAPENTIN 300 MG/1
300 CAPSULE ORAL 2 TIMES DAILY
Qty: 180 CAPSULE | Refills: 1 | Status: SHIPPED | OUTPATIENT
Start: 2024-04-19 | End: 2024-10-16

## 2024-04-19 NOTE — TELEPHONE ENCOUNTER
Viktoriya, April, MA 4/17/2024 4:25 PM EDT      ----- Message -----  From: Rush Lara Jr  Sent: 4/17/2024 4:17 PM EDT  To: *  Subject: Bronchitis and gabapentin     Dr. Meyer,      I know i still have 2 days of prednisone left, but my throat just doesn't seem to be improving, I can barely speak loud enough for people to hear me... the coughing i only cough a few times a day. I'm sure you want me to finish my prednisone and hopefully the next 2 days I will see more improvement. Just wanted to know what our next course of action may be. I have about 30, 500mg cephalexin. But I haven't been taking them, should I start? This bronchitis is going on two weeks, so naturally I just want it gone asap.     The gabapentin, not even sure what dose they had me on, want to say like 600mg 3 times a day, but I'm sure you can find it in my file. Yes I am done with all the other doctors and my back actually doesn't hurt to bad anymore, but when it does, it really does. The gabapentin seemed to help with the pain a little and it also seemed to help my other medication work better. I'm deuce surprised its a narcotic or controlled I guess you said. Anyways, I understand I need to go through you for that now. But I guess it can wait 2 more days to see if this prednisone does its magic.     If you think there is anything else that might help this bronchitis go away quicker(aside from quitting smoking) , please let me know and maybe I can beat this this thing by the end of the weekend.     Just wanted to check in and say the medicine hasn't fixed it yet, but I was hoping it would be improving. Anyways.     Thanks for you time,    Rush Lara

## 2024-04-24 ENCOUNTER — OFFICE VISIT (OUTPATIENT)
Age: 34
End: 2024-04-24
Payer: MEDICARE

## 2024-04-24 VITALS — OXYGEN SATURATION: 94 % | RESPIRATION RATE: 18 BRPM | HEART RATE: 92 BPM | TEMPERATURE: 97.1 F

## 2024-04-24 DIAGNOSIS — J40 BRONCHITIS: Primary | ICD-10-CM

## 2024-04-24 DIAGNOSIS — F17.200 SMOKING: ICD-10-CM

## 2024-04-24 PROCEDURE — 99213 OFFICE O/P EST LOW 20 MIN: CPT | Performed by: FAMILY MEDICINE

## 2024-04-24 RX ORDER — PREDNISONE 20 MG/1
TABLET ORAL
Qty: 18 TABLET | Refills: 0 | Status: SHIPPED | OUTPATIENT
Start: 2024-04-24 | End: 2024-05-02

## 2024-04-24 RX ORDER — BUPROPION HYDROCHLORIDE 150 MG/1
150 TABLET, EXTENDED RELEASE ORAL 2 TIMES DAILY
Qty: 60 TABLET | Refills: 3 | Status: SHIPPED | OUTPATIENT
Start: 2024-04-24

## 2024-04-24 ASSESSMENT — PATIENT HEALTH QUESTIONNAIRE - PHQ9
SUM OF ALL RESPONSES TO PHQ QUESTIONS 1-9: 0
1. LITTLE INTEREST OR PLEASURE IN DOING THINGS: NOT AT ALL
2. FEELING DOWN, DEPRESSED OR HOPELESS: NOT AT ALL
SUM OF ALL RESPONSES TO PHQ9 QUESTIONS 1 & 2: 0
SUM OF ALL RESPONSES TO PHQ QUESTIONS 1-9: 0

## 2024-04-24 NOTE — PROGRESS NOTES
Rush DIXON Marco Galaviz is a 33 y.o. male presenting for/with:    Chief Complaint   Patient presents with    Cough     Cough continues. Seen 4/15/24.        Vitals:    04/24/24 0900   Pulse: 92   Resp: 18   Temp: 97.1 °F (36.2 °C)   TempSrc: Temporal   SpO2: 94%       Pain Scale: /10  Pain Location:     \"Have you been to the ER, urgent care clinic since your last visit?  Hospitalized since your last visit?\"    NO    “Have you seen or consulted any other health care providers outside of [facility] since your last visit?”    NO                 4/24/2024     9:31 AM   PHQ-9    Little interest or pleasure in doing things 0   Feeling down, depressed, or hopeless 0   PHQ-2 Score 0   PHQ-9 Total Score 0           1/19/2024     4:10 PM 7/24/2023     4:10 PM 5/23/2023    10:30 AM 3/20/2023    12:00 AM   [facility] AMB LEARNING ASSESSMENT   Primary Learner Patient Patient Patient Patient   Primary Language ENGLISH ENGLISH ENGLISH ENGLISH   Learning Preference DEMONSTRATION DEMONSTRATION DEMONSTRATION DEMONSTRATION   Answered By demonstration patient patient patient   Relationship to Learner SELF SELF SELF SELF            4/24/2024     9:31 AM   Amb Fall Risk Assessment and TUG Test   Do you feel unsteady or are you worried about falling?  no   2 or more falls in past year? no   Fall with injury in past year? no           4/24/2024     9:00 AM 4/15/2024    11:00 AM 1/19/2024    11:00 AM 9/29/2023     3:00 PM 9/20/2023    11:00 AM 7/24/2023    10:00 AM 7/17/2023    10:00 AM   ADL ASSESSMENT   Feeding yourself No Help Needed No Help Needed No Help Needed No Help Needed No Help Needed No Help Needed No Help Needed   Getting from bed to chair No Help Needed No Help Needed No Help Needed No Help Needed No Help Needed No Help Needed No Help Needed   Getting dressed No Help Needed No Help Needed No Help Needed No Help Needed No Help Needed No Help Needed No Help Needed   Bathing or showering No Help Needed No Help Needed No Help

## 2024-04-24 NOTE — PROGRESS NOTES
Rush Orellanachyna Galaviz is a 33 y.o. male  Chief Complaint   Patient presents with    Cough     Cough continues. Seen 4/15/24.      Seen [facility] 2nd pandemic    HPI:  Symptoms include cough, chest congestion for past 3 weeks. About the same since last visit. Evaluation to date: Seen in ER [facility], had covid, flu, and CXR, all benign. Treatment to date: zpack, prednisone pulse, rest, fluid.    PMH, , Medications/Allergies: reviewed, on chart. Still smoking some.  Current Outpatient Medications   Medication Sig    gabapentin (NEURONTIN) 300 MG capsule Take 1 capsule by mouth 2 times daily for 180 days. Intended supply: 90 days Max Daily Amount: 600 mg    albuterol sulfate HFA (VENTOLIN HFA) 108 (90 Base) MCG/ACT inhaler Inhale 2 puffs into the lungs 4 times daily as needed for Wheezing    benzonatate (TESSALON) 100 MG capsule Take 1 capsule by mouth 3 times daily as needed for Cough    clonazePAM (KLONOPIN) 2 MG tablet Take 1 tablet by mouth 2 times daily as needed for Anxiety for up to 90 days. Indications: DUE FOR VISIT IN MAY Max Daily Amount: 4 mg    clotrimazole-betamethasone (LOTRISONE) 1-0.05 % cream Indications: itchy rash Apply topically 2 times daily.    diclofenac (VOLTAREN) 50 MG EC tablet Take 1 tablet by mouth 2 times daily    methocarbamol (ROBAXIN) 500 MG tablet Take 2 tablets by mouth 4 times daily    QUEtiapine (SEROQUEL) 300 MG tablet Take 1 tablet by mouth at bedtime Indications: sleep and nerves    Cholecalciferol (VITAMIN D3) 1.25 MG (53349 UT) CAPS Take 1 capsule by mouth once a week Indications: LOW CALCIUM     No current facility-administered medications for this visit.      Allergies   Allergen Reactions    Sulfa Antibiotics Other (See Comments)    Sulfamethoxazole-Trimethoprim Rash and Hives     ROS:  Constitutional: No fever, chills or abnormal weight loss  Respiratory: No cough, SOB   CV: No chest pain or Palpitations    VS review:  Wt Readings from Last 3 Encounters:   04/05/24 86.2 kg (190

## 2024-05-10 DIAGNOSIS — F41.9 ANXIETY: ICD-10-CM

## 2024-05-10 RX ORDER — CLONAZEPAM 2 MG/1
2 TABLET ORAL 3 TIMES DAILY PRN
Qty: 60 TABLET | Refills: 2 | Status: SHIPPED | OUTPATIENT
Start: 2024-05-10 | End: 2024-08-08

## 2024-05-10 NOTE — TELEPHONE ENCOUNTER
[phone] contact # job Beverly, would like a refill called into  Pharmacy [place], [place] for the following meds:  KLONOPIN 2 mg tab    Thanks,

## 2024-05-16 DIAGNOSIS — F41.9 ANXIETY: Primary | ICD-10-CM

## 2024-05-16 DIAGNOSIS — J40 BRONCHITIS: ICD-10-CM

## 2024-05-16 DIAGNOSIS — F17.200 SMOKING: ICD-10-CM

## 2024-05-17 ENCOUNTER — PATIENT MESSAGE (OUTPATIENT)
Age: 34
End: 2024-05-17

## 2024-05-17 DIAGNOSIS — F41.9 ANXIETY: ICD-10-CM

## 2024-05-17 NOTE — TELEPHONE ENCOUNTER
Viktoriya, April, MA 5/17/2024 5:06 PM EDT      ----- Message -----  From: Rush Lara Jr  Sent: 5/17/2024 5:03 PM EDT  To: *  Subject: Dermatitis treatment     Doctor Betsy,      First off, the prednisone did finish off the bronchitis, it just like 2 and a half weeks which was surprising to me. Also I've tried calling the office like 3 times today, to ask you about the gabapentin refill and to also discuss my dermatitis.      Just a little history, I've never been able to grow much facial hair until the last couple years, so I always kept my face clean shaven. Well during my depression, winter, and then I thought it looked okay, I had a little beard and mustache(upper lip and chin) and I let it grow out, only trimming it on occasion for about a year and a half. Anyways I decided its summer, I'm looking for work, so I went back to clean shaven about a week ago. Well the immediately I noticed my skin was red where the facial hair was and even a little outside of thay area. I thought it was just dry skin or like dandruff and was secretly scared it was something much more serious.     Long story short, I go to [employer] with a retired doctor and we have bible study meetings during the week sort of. So when I saw him I asked his opinion after I explained everything I jisy did to you. He says he's fairly certain that it is \"dermatitis perioral\". I explained I was putting triple antibiotics on it and trying to keep it moisturized and he told me not to so that anymore.     His suggestion was to take doxycycline 100mg once a day for 3weeks to possibly 12... and he added there's other ways to treat it also. I'm going to try to send a pic

## 2024-05-20 RX ORDER — BUPROPION HYDROCHLORIDE 150 MG/1
150 TABLET, EXTENDED RELEASE ORAL 2 TIMES DAILY
Qty: 180 TABLET | Refills: 2 | Status: SHIPPED | OUTPATIENT
Start: 2024-05-20

## 2024-05-20 RX ORDER — GABAPENTIN 300 MG/1
300 CAPSULE ORAL 3 TIMES DAILY
Qty: 270 CAPSULE | Refills: 1 | Status: SHIPPED | OUTPATIENT
Start: 2024-06-10 | End: 2024-12-07

## 2024-07-25 ENCOUNTER — TELEPHONE (OUTPATIENT)
Age: 34
End: 2024-07-25

## 2024-07-25 DIAGNOSIS — F41.9 ANXIETY: Primary | ICD-10-CM

## 2024-07-25 NOTE — TELEPHONE ENCOUNTER
From Mother Luz:    My son Rush Lara [birth year] is asking me to contact you about calling in his medicine (Anmol) to the [place] [facility] . I have stopped by the office but my son said nothing has been called in .     Please advise.

## 2024-07-26 RX ORDER — MIRTAZAPINE 7.5 MG/1
7.5 TABLET, FILM COATED ORAL NIGHTLY
Qty: 30 TABLET | Refills: 5 | Status: SHIPPED | OUTPATIENT
Start: 2024-07-26

## 2024-08-10 DIAGNOSIS — F41.9 ANXIETY: ICD-10-CM

## 2024-08-13 RX ORDER — CLONAZEPAM 2 MG/1
2 TABLET ORAL 2 TIMES DAILY PRN
Qty: 28 TABLET | Refills: 0 | Status: SHIPPED | OUTPATIENT
Start: 2024-08-13 | End: 2024-08-27

## 2024-08-23 ENCOUNTER — TELEMEDICINE (OUTPATIENT)
Age: 34
End: 2024-08-23

## 2024-08-23 DIAGNOSIS — Z78.9 HISTORY OF INCARCERATION: ICD-10-CM

## 2024-08-23 DIAGNOSIS — F41.9 ANXIETY: Primary | ICD-10-CM

## 2024-08-23 RX ORDER — MIRTAZAPINE 15 MG/1
15 TABLET, FILM COATED ORAL NIGHTLY
Qty: 90 TABLET | Refills: 3 | Status: SHIPPED | OUTPATIENT
Start: 2024-08-23

## 2024-08-23 RX ORDER — GABAPENTIN 300 MG/1
300 CAPSULE ORAL 3 TIMES DAILY
Qty: 90 CAPSULE | Refills: 5 | Status: SHIPPED | OUTPATIENT
Start: 2024-08-23 | End: 2025-02-19

## 2024-08-23 RX ORDER — CLONAZEPAM 2 MG/1
2 TABLET ORAL 2 TIMES DAILY PRN
Qty: 60 TABLET | Refills: 1 | Status: SHIPPED | OUTPATIENT
Start: 2024-08-23 | End: 2024-10-22

## 2024-08-23 SDOH — ECONOMIC STABILITY: FOOD INSECURITY: WITHIN THE PAST 12 MONTHS, YOU WORRIED THAT YOUR FOOD WOULD RUN OUT BEFORE YOU GOT MONEY TO BUY MORE.: NEVER TRUE

## 2024-08-23 SDOH — ECONOMIC STABILITY: INCOME INSECURITY: HOW HARD IS IT FOR YOU TO PAY FOR THE VERY BASICS LIKE FOOD, HOUSING, MEDICAL CARE, AND HEATING?: NOT VERY HARD

## 2024-08-23 SDOH — ECONOMIC STABILITY: FOOD INSECURITY: WITHIN THE PAST 12 MONTHS, THE FOOD YOU BOUGHT JUST DIDN'T LAST AND YOU DIDN'T HAVE MONEY TO GET MORE.: NEVER TRUE

## 2024-08-23 ASSESSMENT — PATIENT HEALTH QUESTIONNAIRE - PHQ9
SUM OF ALL RESPONSES TO PHQ QUESTIONS 1-9: 0
SUM OF ALL RESPONSES TO PHQ QUESTIONS 1-9: 0
SUM OF ALL RESPONSES TO PHQ9 QUESTIONS 1 & 2: 0
1. LITTLE INTEREST OR PLEASURE IN DOING THINGS: NOT AT ALL
SUM OF ALL RESPONSES TO PHQ QUESTIONS 1-9: 0
SUM OF ALL RESPONSES TO PHQ QUESTIONS 1-9: 0
2. FEELING DOWN, DEPRESSED OR HOPELESS: NOT AT ALL

## 2024-08-23 NOTE — PROGRESS NOTES
Rush DIXON Marco  is a 33 y.o. male presenting for/with:  Two patient identifiers given (Name and )  Chief Complaint   Patient presents with    Medication Check       There were no vitals filed for this visit.    Pain Scale: /10  Pain Location:     \"Have you been to the ER, urgent care clinic since your last visit?  Hospitalized since your last visit?\"    NO    “Have you seen or consulted any other health care providers outside of [facility] since your last visit?”    NO                 2024     9:50 AM   PHQ-9    Little interest or pleasure in doing things 0   Feeling down, depressed, or hopeless 0   PHQ-2 Score 0   PHQ-9 Total Score 0           2024     4:10 PM 2023     4:10 PM 2023    10:30 AM 3/20/2023    12:00 AM   [facility] AMB LEARNING ASSESSMENT   Primary Learner Patient Patient Patient Patient   Primary Language ENGLISH ENGLISH ENGLISH ENGLISH   Learning Preference DEMONSTRATION DEMONSTRATION DEMONSTRATION DEMONSTRATION   Answered By demonstration patient patient patient   Relationship to Learner SELF SELF SELF SELF            2024     9:50 AM   Amb Fall Risk Assessment and TUG Test   Do you feel unsteady or are you worried about falling?  no   2 or more falls in past year? no   Fall with injury in past year? no           2024     9:00 AM 2024     9:00 AM 4/15/2024    11:00 AM 2024    11:00 AM 2023     3:00 PM 2023    11:00 AM 2023    10:00 AM   ADL ASSESSMENT   Feeding yourself No Help Needed No Help Needed No Help Needed No Help Needed No Help Needed No Help Needed No Help Needed   Getting from bed to chair No Help Needed No Help Needed No Help Needed No Help Needed No Help Needed No Help Needed No Help Needed   Getting dressed No Help Needed No Help Needed No Help Needed No Help Needed No Help Needed No Help Needed No Help Needed   Bathing or showering No Help Needed No Help Needed No Help Needed No Help Needed No Help Needed No Help 
James  [facility] [id number].    The provider was located at Facility (Appt Dept): [address] [place] [address].

## 2024-09-01 ENCOUNTER — HOSPITAL ENCOUNTER (EMERGENCY)
Facility: HOSPITAL | Age: 34
Discharge: LEFT BEFORE DISCHARGE | End: 2024-09-02
Attending: EMERGENCY MEDICINE
Payer: MEDICAID

## 2024-09-01 VITALS
WEIGHT: 190 LBS | BODY MASS INDEX: 28.14 KG/M2 | TEMPERATURE: 98.8 F | DIASTOLIC BLOOD PRESSURE: 85 MMHG | RESPIRATION RATE: 16 BRPM | HEIGHT: 69 IN | HEART RATE: 99 BPM | OXYGEN SATURATION: 99 % | SYSTOLIC BLOOD PRESSURE: 123 MMHG

## 2024-09-01 DIAGNOSIS — S09.90XA INJURY OF HEAD, INITIAL ENCOUNTER: Primary | ICD-10-CM

## 2024-09-01 DIAGNOSIS — Z53.20 PATIENT LEFT BEFORE TREATMENT COMPLETED: ICD-10-CM

## 2024-09-01 DIAGNOSIS — S51.812A FOREARM LACERATION, LEFT, INITIAL ENCOUNTER: ICD-10-CM

## 2024-09-01 DIAGNOSIS — S46.912A LEFT SHOULDER STRAIN, INITIAL ENCOUNTER: ICD-10-CM

## 2024-09-01 PROCEDURE — 99284 EMERGENCY DEPT VISIT MOD MDM: CPT

## 2024-09-01 RX ORDER — LIDOCAINE HYDROCHLORIDE AND EPINEPHRINE BITARTRATE 20; .01 MG/ML; MG/ML
20 INJECTION, SOLUTION SUBCUTANEOUS
Status: COMPLETED | OUTPATIENT
Start: 2024-09-01 | End: 2024-09-02

## 2024-09-01 RX ORDER — NEOMYCIN/BACITRACIN/POLYMYXINB 3.5-400-5K
OINTMENT (GRAM) TOPICAL
Status: DISCONTINUED | OUTPATIENT
Start: 2024-09-01 | End: 2024-09-02 | Stop reason: HOSPADM

## 2024-09-01 ASSESSMENT — PAIN SCALES - GENERAL: PAINLEVEL_OUTOF10: 9

## 2024-09-01 ASSESSMENT — PAIN - FUNCTIONAL ASSESSMENT: PAIN_FUNCTIONAL_ASSESSMENT: 0-10

## 2024-09-02 ENCOUNTER — APPOINTMENT (OUTPATIENT)
Facility: HOSPITAL | Age: 34
End: 2024-09-02
Payer: MEDICAID

## 2024-09-02 PROCEDURE — 2500000003 HC RX 250 WO HCPCS: Performed by: EMERGENCY MEDICINE

## 2024-09-02 PROCEDURE — 12002 RPR S/N/AX/GEN/TRNK2.6-7.5CM: CPT

## 2024-09-02 PROCEDURE — 73030 X-RAY EXAM OF SHOULDER: CPT

## 2024-09-02 PROCEDURE — 70450 CT HEAD/BRAIN W/O DYE: CPT

## 2024-09-02 PROCEDURE — 71045 X-RAY EXAM CHEST 1 VIEW: CPT

## 2024-09-02 RX ORDER — HYDROCODONE BITARTRATE AND ACETAMINOPHEN 5; 325 MG/1; MG/1
1 TABLET ORAL
Status: DISCONTINUED | OUTPATIENT
Start: 2024-09-02 | End: 2024-09-02 | Stop reason: HOSPADM

## 2024-09-02 RX ADMIN — LIDOCAINE HYDROCHLORIDE,EPINEPHRINE BITARTRATE 20 ML: 20; .01 INJECTION, SOLUTION INFILTRATION; PERINEURAL at 00:01
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
## 2024-09-02 NOTE — ED NOTES
Left the er ambulatory stating he wanted to smoke and norco 5 wasn't going to do any good. Unwilling to wait for medications or discharge papers . Left er ambulatory with female friend

## 2024-10-07 ENCOUNTER — OFFICE VISIT (OUTPATIENT)
Age: 34
End: 2024-10-07
Payer: MEDICAID

## 2024-10-07 VITALS
OXYGEN SATURATION: 98 % | RESPIRATION RATE: 20 BRPM | DIASTOLIC BLOOD PRESSURE: 89 MMHG | TEMPERATURE: 98.7 F | HEIGHT: 69 IN | BODY MASS INDEX: 26.51 KG/M2 | WEIGHT: 179 LBS | SYSTOLIC BLOOD PRESSURE: 115 MMHG | HEART RATE: 102 BPM

## 2024-10-07 DIAGNOSIS — Z51.81 THERAPEUTIC DRUG MONITORING: ICD-10-CM

## 2024-10-07 DIAGNOSIS — F41.9 ANXIETY: Primary | ICD-10-CM

## 2024-10-07 DIAGNOSIS — F10.10 ALCOHOL CONSUMPTION BINGE DRINKING: ICD-10-CM

## 2024-10-07 DIAGNOSIS — F17.200 SMOKING: ICD-10-CM

## 2024-10-07 DIAGNOSIS — Z23 NEEDS FLU SHOT: ICD-10-CM

## 2024-10-07 PROCEDURE — 99213 OFFICE O/P EST LOW 20 MIN: CPT | Performed by: FAMILY MEDICINE

## 2024-10-07 PROCEDURE — 90471 IMMUNIZATION ADMIN: CPT | Performed by: FAMILY MEDICINE

## 2024-10-07 PROCEDURE — 90661 CCIIV3 VAC ABX FR 0.5 ML IM: CPT | Performed by: FAMILY MEDICINE

## 2024-10-07 RX ORDER — BUPROPION HYDROCHLORIDE 150 MG/1
150 TABLET, EXTENDED RELEASE ORAL 2 TIMES DAILY
COMMUNITY
Start: 2024-09-24

## 2024-10-07 RX ORDER — CLONAZEPAM 2 MG/1
2 TABLET ORAL 2 TIMES DAILY PRN
Qty: 60 TABLET | Refills: 1 | Status: SHIPPED | OUTPATIENT
Start: 2024-10-20 | End: 2024-12-19

## 2024-10-07 SDOH — ECONOMIC STABILITY: FOOD INSECURITY: WITHIN THE PAST 12 MONTHS, THE FOOD YOU BOUGHT JUST DIDN'T LAST AND YOU DIDN'T HAVE MONEY TO GET MORE.: NEVER TRUE

## 2024-10-07 SDOH — ECONOMIC STABILITY: FOOD INSECURITY: WITHIN THE PAST 12 MONTHS, YOU WORRIED THAT YOUR FOOD WOULD RUN OUT BEFORE YOU GOT MONEY TO BUY MORE.: NEVER TRUE

## 2024-10-07 SDOH — ECONOMIC STABILITY: INCOME INSECURITY: HOW HARD IS IT FOR YOU TO PAY FOR THE VERY BASICS LIKE FOOD, HOUSING, MEDICAL CARE, AND HEATING?: NOT VERY HARD

## 2024-10-07 ASSESSMENT — PATIENT HEALTH QUESTIONNAIRE - PHQ9
1. LITTLE INTEREST OR PLEASURE IN DOING THINGS: NOT AT ALL
SUM OF ALL RESPONSES TO PHQ QUESTIONS 1-9: 0
2. FEELING DOWN, DEPRESSED OR HOPELESS: NOT AT ALL
SUM OF ALL RESPONSES TO PHQ9 QUESTIONS 1 & 2: 0
SUM OF ALL RESPONSES TO PHQ QUESTIONS 1-9: 0

## 2024-10-07 NOTE — PROGRESS NOTES
Rush DESTINY Lara Jr is a 34 y.o. male presenting for/with:    Chief Complaint   Patient presents with    Medication Check       Vitals:    10/07/24 1412   BP: 115/89   Site: Left Upper Arm   Position: Sitting   Cuff Size: Medium Adult   Pulse: (!) 102   Resp: 20   Temp: 98.7 °F (37.1 °C)   TempSrc: Temporal   SpO2: 98%   Weight: 81.2 kg (179 lb)   Height: 1.753 m (5' 9\")       Pain Scale: 0 - No pain/10  Pain Location:     \"Have you been to the ER, urgent care clinic since your last visit?  Hospitalized since your last visit?\"    [facility]- 09/01/2024-09/02/2024- Injury of head    “Have you seen or consulted any other health care providers outside of [facility] since your last visit?”    NO                 10/7/2024     2:09 PM   PHQ-9    Little interest or pleasure in doing things 0   Feeling down, depressed, or hopeless 0   PHQ-2 Score 0   PHQ-9 Total Score 0           1/19/2024     4:10 PM 7/24/2023     4:10 PM 5/23/2023    10:30 AM 3/20/2023    12:00 AM   [facility] AMB LEARNING ASSESSMENT   Primary Learner Patient Patient Patient Patient   Primary Language ENGLISH ENGLISH ENGLISH ENGLISH   Learning Preference DEMONSTRATION DEMONSTRATION DEMONSTRATION DEMONSTRATION   Answered By demonstration patient patient patient   Relationship to Learner SELF SELF SELF SELF            10/7/2024     2:09 PM   Amb Fall Risk Assessment and TUG Test   Do you feel unsteady or are you worried about falling?  no   2 or more falls in past year? no   Fall with injury in past year? no           10/7/2024     2:00 PM 8/23/2024     9:00 AM 4/24/2024     9:00 AM 4/15/2024    11:00 AM 1/19/2024    11:00 AM 9/29/2023     3:00 PM 9/20/2023    11:00 AM   ADL ASSESSMENT   Feeding yourself No Help Needed No Help Needed No Help Needed No Help Needed No Help Needed No Help Needed No Help Needed   Getting from bed to chair No Help Needed No Help Needed No Help Needed No Help Needed No Help Needed No Help Needed No Help Needed   Getting

## 2024-10-07 NOTE — PROGRESS NOTES
Rush Cartyginger Galaviz is a 34 y.o. male who was seen by synchronous (real-time) audio technology on 10/7/2024.     Subjective:   Medication Check    HPI:  Symptoms include anxiety. Reports sx are well controlled with clonazepam 2mg BID, had to restart that since last visit. Reports the mirtazapine 7.5mg qhs wasn't strong enough, would like to boost that to 15mg, but peggy as high as 30mg in past.  [place]  reviewed. Fill pattern in goal. Tried seroquel, but had bad restless leg with that.    Pt was incarcerated for DWI in summer 2024 after he violated probation. Has finished his [facility] time, and is participating with the ASAP program in [place], due before June 2025. Pt unfortunately has been drinking a little again, is alcohol free now, and is ok with going. Encouraged.    Reviewed PMH, PSH, SH, Medications, allergies (see chart).  Current Outpatient Medications   Medication Sig    buPROPion (WELLBUTRIN SR) 150 MG extended release tablet Take 1 tablet by mouth 2 times daily    mirtazapine (REMERON) 15 MG tablet Take 1 tablet by mouth nightly Indications: nerves and sleep    clonazePAM (KLONOPIN) 2 MG tablet Take 1 tablet by mouth 2 times daily as needed for Anxiety for up to 60 days. Indications: nerves Max Daily Amount: 4 mg    gabapentin (NEURONTIN) 300 MG capsule Take 1 capsule by mouth 3 times daily for 180 days. Intended supply: 90 days Max Daily Amount: 900 mg     No current facility-administered medications for this visit.     ROS:  Constitutional: No fever, chills or abnormal weight loss  Respiratory: No cough, SOB   CV: No chest pain or Palpitations    Objective:     VS review:  Wt Readings from Last 3 Encounters:   10/07/24 81.2 kg (179 lb)   09/01/24 86.2 kg (190 lb)   04/05/24 86.2 kg (190 lb)     BP Readings from Last 3 Encounters:   10/07/24 115/89   09/01/24 123/85   04/05/24 128/74     General: alert, cooperative, and no distress   Mental  status: mental status: alert, oriented to person, place, and

## 2024-10-15 ENCOUNTER — PATIENT MESSAGE (OUTPATIENT)
Age: 34
End: 2024-10-15

## 2024-10-15 DIAGNOSIS — F17.200 SMOKING: Primary | ICD-10-CM

## 2024-10-15 RX ORDER — NICOTINE 21 MG/24HR
1 PATCH, TRANSDERMAL 24 HOURS TRANSDERMAL DAILY
Qty: 30 PATCH | Refills: 4 | Status: SHIPPED | OUTPATIENT
Start: 2024-10-15

## 2024-11-20 ENCOUNTER — PATIENT MESSAGE (OUTPATIENT)
Age: 34
End: 2024-11-20

## 2024-11-20 DIAGNOSIS — F41.9 ANXIETY: Primary | ICD-10-CM

## 2024-11-20 RX ORDER — BUPROPION HYDROCHLORIDE 200 MG/1
200 TABLET, EXTENDED RELEASE ORAL 2 TIMES DAILY
Qty: 180 TABLET | Refills: 3 | Status: SHIPPED | OUTPATIENT
Start: 2024-11-20

## 2024-11-20 RX ORDER — GABAPENTIN 400 MG/1
400 CAPSULE ORAL 3 TIMES DAILY
Qty: 90 CAPSULE | Refills: 5 | Status: SHIPPED | OUTPATIENT
Start: 2024-12-12 | End: 2025-06-10

## 2024-11-20 NOTE — TELEPHONE ENCOUNTER
PT would like to boost dose gabapentin and wellbutrin a notch, helping, but needs a little more potency. Boost gabapentin to 400mg TID when current bottle goes out in early DEC, and boost wellbutrin to 200mg BID. Stay alcohol free.

## 2024-11-26 ENCOUNTER — TELEMEDICINE (OUTPATIENT)
Age: 34
End: 2024-11-26
Payer: MEDICAID

## 2024-11-26 DIAGNOSIS — F31.9 BIPOLAR 1 DISORDER (HCC): ICD-10-CM

## 2024-11-26 DIAGNOSIS — F41.9 ANXIETY: Primary | ICD-10-CM

## 2024-11-26 DIAGNOSIS — Z87.81 HISTORY OF RIB FRACTURE: ICD-10-CM

## 2024-11-26 PROCEDURE — 99442 PR PHYS/QHP TELEPHONE EVALUATION 11-20 MIN: CPT | Performed by: FAMILY MEDICINE

## 2024-11-26 RX ORDER — ARIPIPRAZOLE 10 MG/1
10 TABLET ORAL EVERY MORNING
Qty: 30 TABLET | Refills: 11 | Status: SHIPPED | OUTPATIENT
Start: 2024-11-26

## 2024-11-26 RX ORDER — NAPROXEN 500 MG/1
500 TABLET ORAL 2 TIMES DAILY PRN
Qty: 100 TABLET | Refills: 1 | Status: SHIPPED | OUTPATIENT
Start: 2024-11-26

## 2024-11-26 ASSESSMENT — PATIENT HEALTH QUESTIONNAIRE - PHQ9
2. FEELING DOWN, DEPRESSED OR HOPELESS: NOT AT ALL
SUM OF ALL RESPONSES TO PHQ QUESTIONS 1-9: 0
SUM OF ALL RESPONSES TO PHQ9 QUESTIONS 1 & 2: 0
1. LITTLE INTEREST OR PLEASURE IN DOING THINGS: NOT AT ALL

## 2024-11-26 NOTE — PROGRESS NOTES
Rush Orellanachyna Galaviz is a 34 y.o. male presenting for/with:  Two patient identifiers given. (Name and )  Chief Complaint   Patient presents with    Discuss Medications     Pt reports that he would like to discuss re starting Abilify.     Anxiety       There were no vitals filed for this visit.    Pain Scale: /10  Pain Location:     \"Have you been to the ER, urgent care clinic since your last visit?  Hospitalized since your last visit?\"    NO    “Have you seen or consulted any other health care providers outside of [facility] since your last visit?”    NO                 2024     9:07 AM   PHQ-9    Little interest or pleasure in doing things 0   Feeling down, depressed, or hopeless 0   PHQ-2 Score 0   PHQ-9 Total Score 0           2024     4:10 PM 2023     4:10 PM 2023    10:30 AM 3/20/2023    12:00 AM   [facility] AMB LEARNING ASSESSMENT   Primary Learner Patient Patient Patient Patient   Primary Language ENGLISH ENGLISH ENGLISH ENGLISH   Learning Preference DEMONSTRATION DEMONSTRATION DEMONSTRATION DEMONSTRATION   Answered By demonstration patient patient patient   Relationship to Learner SELF SELF SELF SELF            2024     9:08 AM   Amb Fall Risk Assessment and TUG Test   Do you feel unsteady or are you worried about falling?  no   2 or more falls in past year? no   Fall with injury in past year? yes           2024     9:00 AM 10/7/2024     2:00 PM 2024     9:00 AM 2024     9:00 AM 4/15/2024    11:00 AM 2024    11:00 AM 2023     3:00 PM   ADL ASSESSMENT   Feeding yourself No Help Needed No Help Needed No Help Needed No Help Needed No Help Needed No Help Needed No Help Needed   Getting from bed to chair No Help Needed No Help Needed No Help Needed No Help Needed No Help Needed No Help Needed No Help Needed   Getting dressed No Help Needed No Help Needed No Help Needed No Help Needed No Help Needed No Help Needed No Help Needed   Bathing or

## 2024-11-26 NOTE — PROGRESS NOTES
Rush Cartyginger Galaviz is a 34 y.o. male who was seen by synchronous (real-time) audio technology on 11/26/2024.     Subjective:   Discuss Medications (Pt reports that he would like to discuss re starting Abilify. ) and Anxiety    HPI:  Symptoms include anxiety. Reports sx are fairly well controlled with clonazepam 2mg BID, had to restart that since last visit. Went off the mirtazapine 15mg qhs since last visit.     We did boost the gabapentin to 400mg TID a few days ago on 11/20/24, hasn't noticed much improvement yet with that, but peggy well. [facility] reviewed. Fill pattern in goal.     Tried seroquel, but had bad restless leg with that. Did well on abilify in past, interested in r/s that.     Pt was incarcerated for DWI in summer 2024 after he violated probation. Has finished his assisted time, and is participating with the ASAP program in [place], due before June 2025. Pt back to being alcohol free, and has testing 3x/wk.     Con't wellbutrin to 200mg BID. Stay alcohol free.    Reviewed PMH, PSH, SH, Medications, allergies (see chart).  Current Outpatient Medications   Medication Sig    [START ON 12/12/2024] gabapentin (NEURONTIN) 400 MG capsule Take 1 capsule by mouth 3 times daily for 180 days. Intended supply: 90 days Max Daily Amount: 1,200 mg    buPROPion (WELLBUTRIN SR) 200 MG extended release tablet Take 1 tablet by mouth 2 times daily Indications: nerves and depression    nicotine (NICODERM CQ) 21 MG/24HR Place 1 patch onto the skin daily Indications: help quit smoking    nicotine polacrilex (COMMIT) 4 MG lozenge Take 1 lozenge by mouth as needed for Smoking cessation    clonazePAM (KLONOPIN) 2 MG tablet Take 1 tablet by mouth 2 times daily as needed for Anxiety for up to 60 days. Indications: nerves Max Daily Amount: 4 mg    nicotine polacrilex (NICORETTE) 4 MG gum Take 1 each by mouth as needed for Smoking cessation (Patient not taking: Reported on 11/26/2024)    mirtazapine (REMERON) 15 MG tablet Take 1

## 2024-12-06 ENCOUNTER — TELEMEDICINE (OUTPATIENT)
Age: 34
End: 2024-12-06
Payer: MEDICAID

## 2024-12-06 DIAGNOSIS — F17.200 SMOKING: ICD-10-CM

## 2024-12-06 DIAGNOSIS — Z87.81 HISTORY OF RIB FRACTURE: ICD-10-CM

## 2024-12-06 DIAGNOSIS — F41.1 GENERALIZED ANXIETY DISORDER: Primary | ICD-10-CM

## 2024-12-06 PROCEDURE — 99442 PR PHYS/QHP TELEPHONE EVALUATION 11-20 MIN: CPT | Performed by: FAMILY MEDICINE

## 2024-12-06 RX ORDER — GABAPENTIN 600 MG/1
600 TABLET ORAL 3 TIMES DAILY
Qty: 90 TABLET | Refills: 2 | Status: SHIPPED | OUTPATIENT
Start: 2024-12-06 | End: 2025-03-06

## 2024-12-06 RX ORDER — CLONAZEPAM 2 MG/1
2 TABLET ORAL 2 TIMES DAILY PRN
Qty: 60 TABLET | Refills: 2 | Status: SHIPPED | OUTPATIENT
Start: 2024-12-19 | End: 2025-02-17

## 2024-12-06 RX ORDER — SENNOSIDES 8.6 MG
1300 CAPSULE ORAL EVERY 8 HOURS PRN
Qty: 360 TABLET | Refills: 11 | Status: SHIPPED | OUTPATIENT
Start: 2024-12-06

## 2024-12-06 ASSESSMENT — PATIENT HEALTH QUESTIONNAIRE - PHQ9
SUM OF ALL RESPONSES TO PHQ QUESTIONS 1-9: 0
8. MOVING OR SPEAKING SO SLOWLY THAT OTHER PEOPLE COULD HAVE NOTICED. OR THE OPPOSITE, BEING SO FIGETY OR RESTLESS THAT YOU HAVE BEEN MOVING AROUND A LOT MORE THAN USUAL: NOT AT ALL
SUM OF ALL RESPONSES TO PHQ QUESTIONS 1-9: 0
SUM OF ALL RESPONSES TO PHQ9 QUESTIONS 1 & 2: 0
9. THOUGHTS THAT YOU WOULD BE BETTER OFF DEAD, OR OF HURTING YOURSELF: NOT AT ALL
SUM OF ALL RESPONSES TO PHQ QUESTIONS 1-9: 0
2. FEELING DOWN, DEPRESSED OR HOPELESS: NOT AT ALL
6. FEELING BAD ABOUT YOURSELF - OR THAT YOU ARE A FAILURE OR HAVE LET YOURSELF OR YOUR FAMILY DOWN: NOT AT ALL
5. POOR APPETITE OR OVEREATING: NOT AT ALL
4. FEELING TIRED OR HAVING LITTLE ENERGY: NOT AT ALL
1. LITTLE INTEREST OR PLEASURE IN DOING THINGS: NOT AT ALL
7. TROUBLE CONCENTRATING ON THINGS, SUCH AS READING THE NEWSPAPER OR WATCHING TELEVISION: NOT AT ALL
SUM OF ALL RESPONSES TO PHQ QUESTIONS 1-9: 0
3. TROUBLE FALLING OR STAYING ASLEEP: NOT AT ALL

## 2024-12-06 NOTE — PROGRESS NOTES
Attempted to reach patient .. No answer and MODESTA full   
Rush Cartyginger Galaviz is a 34 y.o. male who was seen by synchronous (real-time) audio technology on 12/6/2024.     Subjective:   Medication Check    HPI:  Symptoms include anxiety. Reports sx are well controlled with clonazepam 2mg BID, abilify 10mg/d, wellbutrin 200mg BID, and gabapentin (also taking for rib/nerve pain).  PHQ-9 Total Score: 0 (12/6/2024  4:29 PM)  Thoughts that you would be better off dead, or of hurting yourself in some way: 0 (12/6/2024  4:29 PM)    Nerve pain  We boosted to 400mg gabapentin TID (from 300 TID) about 2 weeks ago, but not really improving. Ready to boost to next step, peggy well. Staying drug free, has to do testing weekly.  [place] [facility] reviewed. Fill pattern in goal.    Reviewed PMH, PSH, SH, Medications, allergies (see chart).  Current Outpatient Medications   Medication Sig    ARIPiprazole (ABILIFY) 10 MG tablet Take 1 tablet by mouth every morning Indications: nerves    naproxen (NAPROSYN) 500 MG tablet Take 1 tablet by mouth 2 times daily as needed for Pain    [START ON 12/12/2024] gabapentin (NEURONTIN) 400 MG capsule Take 1 capsule by mouth 3 times daily for 180 days. Intended supply: 90 days Max Daily Amount: 1,200 mg    buPROPion (WELLBUTRIN SR) 200 MG extended release tablet Take 1 tablet by mouth 2 times daily Indications: nerves and depression    nicotine (NICODERM CQ) 21 MG/24HR Place 1 patch onto the skin daily Indications: help quit smoking    nicotine polacrilex (COMMIT) 4 MG lozenge Take 1 lozenge by mouth as needed for Smoking cessation    clonazePAM (KLONOPIN) 2 MG tablet Take 1 tablet by mouth 2 times daily as needed for Anxiety for up to 60 days. Indications: nerves Max Daily Amount: 4 mg    nicotine polacrilex (NICORETTE) 4 MG gum Take 1 each by mouth as needed for Smoking cessation (Patient not taking: Reported on 12/6/2024)     No current facility-administered medications for this visit.     ROS:  Constitutional: No fever, chills or abnormal weight 
   11:00 AM 1/19/2024    11:00 AM   ADL ASSESSMENT   Feeding yourself No Help Needed No Help Needed No Help Needed No Help Needed No Help Needed No Help Needed No Help Needed   Getting from bed to chair No Help Needed No Help Needed No Help Needed No Help Needed No Help Needed No Help Needed No Help Needed   Getting dressed No Help Needed No Help Needed No Help Needed No Help Needed No Help Needed No Help Needed No Help Needed   Bathing or showering No Help Needed No Help Needed No Help Needed No Help Needed No Help Needed No Help Needed No Help Needed   Walk across the room (includes cane/walker) No Help Needed No Help Needed No Help Needed No Help Needed No Help Needed No Help Needed No Help Needed   Using the telphone No Help Needed No Help Needed No Help Needed No Help Needed No Help Needed No Help Needed No Help Needed   Taking your medications No Help Needed No Help Needed No Help Needed No Help Needed No Help Needed No Help Needed No Help Needed   Preparing meals No Help Needed No Help Needed No Help Needed No Help Needed No Help Needed No Help Needed No Help Needed   Managing money (expenses/bills) No Help Needed No Help Needed No Help Needed No Help Needed No Help Needed No Help Needed No Help Needed   Moderately strenuous housework (laundry) No Help Needed No Help Needed No Help Needed No Help Needed No Help Needed No Help Needed No Help Needed   Shopping for personal items (toiletries/medicines) No Help Needed No Help Needed No Help Needed No Help Needed No Help Needed No Help Needed No Help Needed   Shopping for groceries No Help Needed No Help Needed No Help Needed No Help Needed No Help Needed No Help Needed No Help Needed   Driving No Help Needed No Help Needed No Help Needed No Help Needed No Help Needed No Help Needed No Help Needed   Climbing a flight of stairs No Help Needed No Help Needed No Help Needed No Help Needed No Help Needed No Help Needed No Help Needed   Getting to places beyond walking

## 2024-12-17 ENCOUNTER — PATIENT MESSAGE (OUTPATIENT)
Age: 34
End: 2024-12-17

## 2024-12-19 DIAGNOSIS — F41.9 ANXIETY: ICD-10-CM

## 2024-12-19 DIAGNOSIS — F31.9 BIPOLAR 1 DISORDER (HCC): ICD-10-CM

## 2024-12-20 RX ORDER — ARIPIPRAZOLE 10 MG/1
10 TABLET ORAL EVERY MORNING
Qty: 90 TABLET | Refills: 4 | Status: SHIPPED | OUTPATIENT
Start: 2024-12-20

## 2025-02-05 DIAGNOSIS — F17.200 SMOKING: ICD-10-CM

## 2025-03-12 DIAGNOSIS — F41.1 GENERALIZED ANXIETY DISORDER: ICD-10-CM

## 2025-03-12 DIAGNOSIS — Z87.81 HISTORY OF RIB FRACTURE: ICD-10-CM

## 2025-03-12 DIAGNOSIS — F17.200 SMOKING: ICD-10-CM

## 2025-03-12 RX ORDER — NAPROXEN 500 MG/1
500 TABLET ORAL 2 TIMES DAILY PRN
Qty: 100 TABLET | Refills: 0 | Status: SHIPPED | OUTPATIENT
Start: 2025-03-12

## 2025-03-12 RX ORDER — GABAPENTIN 600 MG/1
600 TABLET ORAL 3 TIMES DAILY
Qty: 90 TABLET | Refills: 0 | Status: SHIPPED | OUTPATIENT
Start: 2025-03-12 | End: 2025-06-10

## 2025-03-12 RX ORDER — SENNOSIDES 8.6 MG
1300 CAPSULE ORAL EVERY 8 HOURS PRN
Qty: 360 TABLET | Refills: 11 | Status: SHIPPED | OUTPATIENT
Start: 2025-03-12

## 2025-03-12 RX ORDER — CLONAZEPAM 2 MG/1
2 TABLET ORAL 2 TIMES DAILY PRN
Qty: 60 TABLET | Refills: 0 | Status: SHIPPED | OUTPATIENT
Start: 2025-03-15 | End: 2025-05-14

## 2025-04-09 DIAGNOSIS — F41.1 GENERALIZED ANXIETY DISORDER: ICD-10-CM

## 2025-04-09 DIAGNOSIS — F41.9 ANXIETY: ICD-10-CM

## 2025-04-09 DIAGNOSIS — Z87.81 HISTORY OF RIB FRACTURE: ICD-10-CM

## 2025-04-09 DIAGNOSIS — F31.9 BIPOLAR 1 DISORDER (HCC): ICD-10-CM

## 2025-04-09 RX ORDER — BUPROPION HYDROCHLORIDE 200 MG/1
200 TABLET, EXTENDED RELEASE ORAL 2 TIMES DAILY
Qty: 60 TABLET | Refills: 0 | Status: SHIPPED | OUTPATIENT
Start: 2025-04-09

## 2025-04-09 RX ORDER — CLONAZEPAM 2 MG/1
2 TABLET ORAL 2 TIMES DAILY PRN
Qty: 60 TABLET | Refills: 0 | Status: SHIPPED | OUTPATIENT
Start: 2025-04-11 | End: 2025-06-10

## 2025-04-09 RX ORDER — ARIPIPRAZOLE 10 MG/1
10 TABLET ORAL EVERY MORNING
Qty: 30 TABLET | Refills: 0 | Status: SHIPPED | OUTPATIENT
Start: 2025-04-09

## 2025-04-09 RX ORDER — GABAPENTIN 600 MG/1
600 TABLET ORAL 3 TIMES DAILY
Qty: 90 TABLET | Refills: 0 | Status: SHIPPED | OUTPATIENT
Start: 2025-04-11 | End: 2025-07-10

## 2025-04-09 RX ORDER — SENNOSIDES 8.6 MG
1300 CAPSULE ORAL EVERY 8 HOURS PRN
Qty: 360 TABLET | Refills: 0 | Status: SHIPPED | OUTPATIENT
Start: 2025-04-09

## 2025-05-08 DIAGNOSIS — F31.9 BIPOLAR 1 DISORDER (HCC): ICD-10-CM

## 2025-05-08 DIAGNOSIS — Z87.81 HISTORY OF RIB FRACTURE: ICD-10-CM

## 2025-05-08 DIAGNOSIS — F41.9 ANXIETY: ICD-10-CM

## 2025-05-08 DIAGNOSIS — F41.1 GENERALIZED ANXIETY DISORDER: ICD-10-CM

## 2025-05-09 DIAGNOSIS — Z87.81 HISTORY OF RIB FRACTURE: ICD-10-CM

## 2025-05-09 RX ORDER — GABAPENTIN 600 MG/1
600 TABLET ORAL 3 TIMES DAILY
Qty: 15 TABLET | Refills: 0 | Status: SHIPPED | OUTPATIENT
Start: 2025-05-09 | End: 2025-08-07

## 2025-05-09 RX ORDER — CLONAZEPAM 2 MG/1
2 TABLET ORAL 2 TIMES DAILY PRN
Qty: 5 TABLET | Refills: 0 | Status: SHIPPED | OUTPATIENT
Start: 2025-05-09 | End: 2025-07-08

## 2025-05-09 RX ORDER — NAPROXEN 500 MG/1
500 TABLET ORAL 2 TIMES DAILY PRN
Qty: 100 TABLET | Refills: 0 | OUTPATIENT
Start: 2025-05-09

## 2025-05-09 RX ORDER — SENNOSIDES 8.6 MG
1300 CAPSULE ORAL EVERY 8 HOURS PRN
Qty: 360 TABLET | Refills: 0 | Status: SHIPPED | OUTPATIENT
Start: 2025-05-09

## 2025-05-09 RX ORDER — ARIPIPRAZOLE 10 MG/1
10 TABLET ORAL EVERY MORNING
Qty: 30 TABLET | Refills: 0 | Status: SHIPPED | OUTPATIENT
Start: 2025-05-09

## 2025-05-09 RX ORDER — BUPROPION HYDROCHLORIDE 200 MG/1
200 TABLET, EXTENDED RELEASE ORAL 2 TIMES DAILY
Qty: 60 TABLET | Refills: 0 | Status: SHIPPED | OUTPATIENT
Start: 2025-05-09

## 2025-05-13 DIAGNOSIS — Z87.81 HISTORY OF RIB FRACTURE: ICD-10-CM

## 2025-05-13 DIAGNOSIS — F41.1 GENERALIZED ANXIETY DISORDER: ICD-10-CM

## 2025-05-13 RX ORDER — GABAPENTIN 600 MG/1
600 TABLET ORAL 3 TIMES DAILY
Qty: 18 TABLET | Refills: 0 | Status: SHIPPED | OUTPATIENT
Start: 2025-05-13 | End: 2025-05-19 | Stop reason: SDUPTHER

## 2025-05-13 RX ORDER — CLONAZEPAM 2 MG/1
2 TABLET ORAL 2 TIMES DAILY PRN
Qty: 12 TABLET | Refills: 0 | Status: SHIPPED | OUTPATIENT
Start: 2025-05-13 | End: 2025-05-19 | Stop reason: SDUPTHER

## 2025-05-16 SDOH — ECONOMIC STABILITY: FOOD INSECURITY: WITHIN THE PAST 12 MONTHS, YOU WORRIED THAT YOUR FOOD WOULD RUN OUT BEFORE YOU GOT MONEY TO BUY MORE.: NEVER TRUE

## 2025-05-16 SDOH — ECONOMIC STABILITY: FOOD INSECURITY: WITHIN THE PAST 12 MONTHS, THE FOOD YOU BOUGHT JUST DIDN'T LAST AND YOU DIDN'T HAVE MONEY TO GET MORE.: SOMETIMES TRUE

## 2025-05-16 SDOH — ECONOMIC STABILITY: INCOME INSECURITY: IN THE LAST 12 MONTHS, WAS THERE A TIME WHEN YOU WERE NOT ABLE TO PAY THE MORTGAGE OR RENT ON TIME?: YES

## 2025-05-16 SDOH — ECONOMIC STABILITY: TRANSPORTATION INSECURITY
IN THE PAST 12 MONTHS, HAS THE LACK OF TRANSPORTATION KEPT YOU FROM MEDICAL APPOINTMENTS OR FROM GETTING MEDICATIONS?: YES

## 2025-05-16 SDOH — ECONOMIC STABILITY: TRANSPORTATION INSECURITY
IN THE PAST 12 MONTHS, HAS LACK OF TRANSPORTATION KEPT YOU FROM MEETINGS, WORK, OR FROM GETTING THINGS NEEDED FOR DAILY LIVING?: YES

## 2025-05-19 ENCOUNTER — TELEMEDICINE (OUTPATIENT)
Age: 35
End: 2025-05-19
Payer: MEDICAID

## 2025-05-19 DIAGNOSIS — F41.1 GENERALIZED ANXIETY DISORDER: ICD-10-CM

## 2025-05-19 DIAGNOSIS — Z87.81 HISTORY OF RIB FRACTURE: ICD-10-CM

## 2025-05-19 PROCEDURE — 99213 OFFICE O/P EST LOW 20 MIN: CPT | Performed by: FAMILY MEDICINE

## 2025-05-19 RX ORDER — GABAPENTIN 600 MG/1
600 TABLET ORAL 3 TIMES DAILY
Qty: 90 TABLET | Refills: 3 | Status: SHIPPED | OUTPATIENT
Start: 2025-05-19 | End: 2025-09-16

## 2025-05-19 RX ORDER — CLONAZEPAM 2 MG/1
2 TABLET ORAL 2 TIMES DAILY PRN
Qty: 60 TABLET | Refills: 3 | Status: SHIPPED | OUTPATIENT
Start: 2025-05-19 | End: 2025-09-16

## 2025-05-19 ASSESSMENT — PATIENT HEALTH QUESTIONNAIRE - PHQ9
2. FEELING DOWN, DEPRESSED OR HOPELESS: NOT AT ALL
1. LITTLE INTEREST OR PLEASURE IN DOING THINGS: NOT AT ALL
SUM OF ALL RESPONSES TO PHQ QUESTIONS 1-9: 0

## 2025-05-19 NOTE — PROGRESS NOTES
Rush DIXON Marco Galaviz is a 34 y.o. male presenting for/with:    Chief Complaint   Patient presents with    Medication Check       There were no vitals filed for this visit.    Pain Scale: /10  Pain Location:     \"Have you been to the ER, urgent care clinic since your last visit?  Hospitalized since your last visit?\"    NO    “Have you seen or consulted any other health care providers outside of [facility] since your last visit?”    YES Dentist                 5/19/2025    10:32 AM   PHQ-9    Little interest or pleasure in doing things 0   Feeling down, depressed, or hopeless 0   PHQ-2 Score 0   PHQ-9 Total Score 0           1/19/2024     4:10 PM 7/24/2023     4:10 PM 5/23/2023    10:30 AM 3/20/2023    12:00 AM   [facility] AMB LEARNING ASSESSMENT   Primary Learner Patient Patient Patient Patient   Primary Language ENGLISH ENGLISH ENGLISH ENGLISH   Learning Preference DEMONSTRATION DEMONSTRATION DEMONSTRATION DEMONSTRATION   Answered By demonstration patient patient patient   Relationship to Learner SELF SELF SELF SELF            5/19/2025    10:32 AM   Amb Fall Risk Assessment and TUG Test   Do you feel unsteady or are you worried about falling?  no   2 or more falls in past year? no   Fall with injury in past year? no           5/19/2025    10:00 AM 12/6/2024     4:00 PM 11/26/2024     9:00 AM 10/7/2024     2:00 PM 8/23/2024     9:00 AM 4/24/2024     9:00 AM 4/15/2024    11:00 AM   ADL ASSESSMENT   Feeding yourself No Help Needed No Help Needed No Help Needed No Help Needed No Help Needed No Help Needed No Help Needed   Getting from bed to chair No Help Needed No Help Needed No Help Needed No Help Needed No Help Needed No Help Needed No Help Needed   Getting dressed No Help Needed No Help Needed No Help Needed No Help Needed No Help Needed No Help Needed No Help Needed   Bathing or showering No Help Needed No Help Needed No Help Needed No Help Needed No Help Needed No Help Needed No Help Needed   Walk across 
    Services were provided through audio synchronous discussion virtually to substitute for in-person clinic visit.  An electronic signature was used to authenticate this note.    Rush Lara Jr was evaluated through a synchronous (real-time) audio encounter. Patient identification was verified at the start of the visit. He (or guardian if applicable) is aware that this is a billable service, which includes applicable co-pays. This visit was conducted with the patient's (and/or legal guardian's) verbal consent. He has not had a related appointment within my department in the past 7 days or scheduled within the next 24 hours.     The patient was located at Home: [address] [place] [id number].    The provider was located at Facility (Appt Dept): [address] [place] [address].

## 2025-07-11 ENCOUNTER — HOSPITAL ENCOUNTER (EMERGENCY)
Facility: HOSPITAL | Age: 35
Discharge: HOME | End: 2025-07-11
Attending: EMERGENCY MEDICINE
Payer: MEDICAID

## 2025-07-11 ENCOUNTER — APPOINTMENT (OUTPATIENT)
Facility: HOSPITAL | Age: 35
End: 2025-07-11
Payer: MEDICAID

## 2025-07-11 VITALS
SYSTOLIC BLOOD PRESSURE: 133 MMHG | TEMPERATURE: 97.5 F | OXYGEN SATURATION: 100 % | RESPIRATION RATE: 16 BRPM | BODY MASS INDEX: 29.62 KG/M2 | WEIGHT: 200 LBS | DIASTOLIC BLOOD PRESSURE: 94 MMHG | HEIGHT: 69 IN | HEART RATE: 75 BPM

## 2025-07-11 DIAGNOSIS — R07.81 RIB PAIN ON RIGHT SIDE: Primary | ICD-10-CM

## 2025-07-11 DIAGNOSIS — M54.6 ACUTE RIGHT-SIDED THORACIC BACK PAIN: ICD-10-CM

## 2025-07-11 PROCEDURE — 99284 EMERGENCY DEPT VISIT MOD MDM: CPT

## 2025-07-11 PROCEDURE — 71250 CT THORAX DX C-: CPT

## 2025-07-11 PROCEDURE — 6370000000 HC RX 637 (ALT 250 FOR IP): Performed by: EMERGENCY MEDICINE

## 2025-07-11 RX ORDER — OXYCODONE AND ACETAMINOPHEN 5; 325 MG/1; MG/1
1 TABLET ORAL
Refills: 0 | Status: COMPLETED | OUTPATIENT
Start: 2025-07-11 | End: 2025-07-11

## 2025-07-11 RX ORDER — OXYCODONE AND ACETAMINOPHEN 5; 325 MG/1; MG/1
1 TABLET ORAL EVERY 6 HOURS PRN
Qty: 12 TABLET | Refills: 0 | Status: SHIPPED | OUTPATIENT
Start: 2025-07-11 | End: 2025-07-14

## 2025-07-11 RX ORDER — METHOCARBAMOL 750 MG/1
750 TABLET, FILM COATED ORAL 3 TIMES DAILY PRN
Qty: 20 TABLET | Refills: 0 | Status: SHIPPED | OUTPATIENT
Start: 2025-07-11 | End: 2025-07-21

## 2025-07-11 RX ORDER — KETOROLAC TROMETHAMINE 10 MG/1
10 TABLET, FILM COATED ORAL EVERY 6 HOURS PRN
Qty: 30 TABLET | Refills: 0 | Status: SHIPPED | OUTPATIENT
Start: 2025-07-11

## 2025-07-11 RX ADMIN — OXYCODONE HYDROCHLORIDE AND ACETAMINOPHEN 1 TABLET: 5; 325 TABLET ORAL at 06:03

## 2025-07-11 ASSESSMENT — ENCOUNTER SYMPTOMS
DIARRHEA: 0
BACK PAIN: 1
ABDOMINAL PAIN: 0
VOMITING: 0
SHORTNESS OF BREATH: 0
SORE THROAT: 0

## 2025-07-11 ASSESSMENT — PAIN DESCRIPTION - LOCATION: LOCATION: BACK

## 2025-07-11 ASSESSMENT — PAIN SCALES - GENERAL: PAINLEVEL_OUTOF10: 9

## 2025-07-11 ASSESSMENT — PAIN - FUNCTIONAL ASSESSMENT: PAIN_FUNCTIONAL_ASSESSMENT: 0-10

## 2025-07-11 ASSESSMENT — LIFESTYLE VARIABLES
HOW OFTEN DO YOU HAVE A DRINK CONTAINING ALCOHOL: MONTHLY OR LESS
HOW MANY STANDARD DRINKS CONTAINING ALCOHOL DO YOU HAVE ON A TYPICAL DAY: 1 OR 2

## 2025-07-11 ASSESSMENT — PAIN DESCRIPTION - ORIENTATION: ORIENTATION: RIGHT;MID

## 2025-07-11 NOTE — ED PROVIDER NOTES
[facility] EMERGENCY DEPARTMENT  EMERGENCY DEPARTMENT ENCOUNTER       Pt Name: Rush Lara Jr  MRN: [id number]  Birthdate 1990  Date of evaluation: 7/11/2025  Provider: Laron Logan MD   PCP: Ar Meyer MD  Note Started: 6:49 AM 7/11/25      FINAL IMPRESSION     1. Rib pain on right side    2. Acute right-sided thoracic back pain          DISPOSITION/PLAN     Disposition:  DISPOSITION Decision To Discharge 07/11/2025 06:47:58 AM   DISPOSITION CONDITION Stable           Discharge Note: The patient is stable for discharge home. The signs, symptoms, diagnosis, and discharge instructions have been discussed, understanding conveyed, and agreed upon. The patient is to follow up as recommended or return to ER should their symptoms worsen.      PATIENT REFERRED TO:  Ar Meyer MD  [address] [place] [id number]  [phone]    Schedule an appointment as soon as possible for a visit in 2 days  For Follow Up            DISCHARGE MEDICATIONS:     Medication List        START taking these medications      ketorolac 10 MG tablet  Commonly known as: TORADOL  Take 1 tablet by mouth every 6 hours as needed for Pain     methocarbamol 750 MG tablet  Commonly known as: ROBAXIN  Take 1 tablet by mouth 3 times daily as needed (spasm)     oxyCODONE-acetaminophen 5-325 MG per tablet  Commonly known as: Percocet  Take 1 tablet by mouth every 6 hours as needed for Pain for up to 3 days. Intended supply: 3 days. Take lowest dose possible to manage pain Max Daily Amount: 4 tablets            ASK your doctor about these medications      acetaminophen 650 MG extended release tablet  Commonly known as: TYLENOL  Take 2 tablets by mouth every 8 hours as needed for Pain     ARIPiprazole 10 MG tablet  Commonly known as: ABILIFY  Take 1 tablet by mouth every morning Indications: nerves     buPROPion 200 MG extended release tablet  Commonly known as: WELLBUTRIN SR  Take 1 tablet by mouth 2 times daily  Previous Radiology Studies        CC/HPI Summary, DDx, ED Course, and Reassessment:   Patient presents complaining of right upper back pain reproducible with movement and deep breathing over the last 3 weeks, sneezed last night with increase in pain he has focal tenderness in the paraspinal region right thoracic spine, he does do a lot of heavy lifting and movement at work.  No relief with over-the-counter meds given a Percocet will write prescription f to take for severe pain given Toradol Robaxin to take.  Symptoms most consistent with musculoskeletal pain.  CT was performed he was concerned about his previous rib fracture hardware no hardware failure seen no new fracture.  PERC negative no tachycardia hypoxia no symptoms suggest PE.  PCP for follow-up return here for change or worsening symptoms patient instructed to limit work and no heavy lifting over the next several days    ED Course:   Initial assessment performed. The patients presenting problems have been discussed, and they are in agreement with the care plan formulated and outlined with them.  I have encouraged them to ask questions as they arise throughout their visit.             6:49 AM    Pt has been re-examined and states that they are feeling better and have no new complaints.   medications, x-rays, diagnosis, follow up plan and return instructions have been reviewed and discussed with the patient and/or family. Pt and/or family were instructed on symptoms that may arise after discharge requiring re-evaluation by a physician. Pt and/or family have had the opportunity to ask questions about their care. Patient and/or family verbalized understanding and agreement with care plan, follow up and return instructions. Patient and/or family agree to return in 48 hours if their symptoms are not improving or immediately if they have any change in their condition.      Narcotics were prescribed, pt was advised not to drive or operate heavy machinery.     I

## 2025-07-11 NOTE — ED TRIAGE NOTES
Patient came in with c/o R sided mid back pain mainly over the ribs that he had surgery on in 2023 from a fall. There has been no further trauma. He has been taking tylenol and motrin for pain with little relief.

## 2025-09-02 ENCOUNTER — TELEMEDICINE (OUTPATIENT)
Age: 35
End: 2025-09-02
Payer: MEDICAID

## 2025-09-02 DIAGNOSIS — D64.9 ANEMIA, UNSPECIFIED TYPE: ICD-10-CM

## 2025-09-02 DIAGNOSIS — Z87.81 HISTORY OF RIB FRACTURE: ICD-10-CM

## 2025-09-02 DIAGNOSIS — Z13.6 SCREENING FOR CARDIOVASCULAR CONDITION: ICD-10-CM

## 2025-09-02 DIAGNOSIS — D75.839 THROMBOCYTOSIS: ICD-10-CM

## 2025-09-02 DIAGNOSIS — F31.9 BIPOLAR 1 DISORDER (HCC): ICD-10-CM

## 2025-09-02 DIAGNOSIS — F41.9 ANXIETY: ICD-10-CM

## 2025-09-02 DIAGNOSIS — Z13.1 SCREENING FOR DIABETES MELLITUS: ICD-10-CM

## 2025-09-02 DIAGNOSIS — R79.89 ABNORMAL THYROID BLOOD TEST: ICD-10-CM

## 2025-09-02 DIAGNOSIS — F41.1 GENERALIZED ANXIETY DISORDER: Primary | ICD-10-CM

## 2025-09-02 PROCEDURE — 99214 OFFICE O/P EST MOD 30 MIN: CPT | Performed by: FAMILY MEDICINE

## 2025-09-02 RX ORDER — CLONAZEPAM 2 MG/1
2 TABLET ORAL 2 TIMES DAILY PRN
Qty: 60 TABLET | Refills: 2 | Status: SHIPPED | OUTPATIENT
Start: 2025-09-12 | End: 2025-12-11

## 2025-09-02 RX ORDER — GABAPENTIN 600 MG/1
600 TABLET ORAL 3 TIMES DAILY
Qty: 90 TABLET | Refills: 3 | Status: CANCELLED | OUTPATIENT
Start: 2025-09-02 | End: 2025-12-31

## 2025-09-02 RX ORDER — BUPROPION HYDROCHLORIDE 200 MG/1
200 TABLET, EXTENDED RELEASE ORAL 2 TIMES DAILY
Qty: 180 TABLET | Refills: 3 | Status: SHIPPED | OUTPATIENT
Start: 2025-09-02

## 2025-09-02 RX ORDER — GABAPENTIN 600 MG/1
600 TABLET ORAL 3 TIMES DAILY
Qty: 90 TABLET | Refills: 2 | Status: SHIPPED | OUTPATIENT
Start: 2025-09-16 | End: 2025-12-15

## 2025-09-02 RX ORDER — ARIPIPRAZOLE 10 MG/1
10 TABLET ORAL EVERY MORNING
Qty: 90 TABLET | Refills: 3 | Status: SHIPPED | OUTPATIENT
Start: 2025-09-02

## 2025-09-02 ASSESSMENT — PATIENT HEALTH QUESTIONNAIRE - PHQ9
SUM OF ALL RESPONSES TO PHQ QUESTIONS 1-9: 0
2. FEELING DOWN, DEPRESSED OR HOPELESS: NOT AT ALL
SUM OF ALL RESPONSES TO PHQ QUESTIONS 1-9: 0
SUM OF ALL RESPONSES TO PHQ QUESTIONS 1-9: 0
1. LITTLE INTEREST OR PLEASURE IN DOING THINGS: NOT AT ALL
SUM OF ALL RESPONSES TO PHQ QUESTIONS 1-9: 0